# Patient Record
Sex: FEMALE | Race: WHITE
[De-identification: names, ages, dates, MRNs, and addresses within clinical notes are randomized per-mention and may not be internally consistent; named-entity substitution may affect disease eponyms.]

---

## 2019-10-01 LAB
ANION GAP SERPL CALC-SCNC: 15 MMOL/L (ref 8–16)
BASOPHILS NFR BLD AUTO: 0.3 % (ref 0–2)
BUN SERPL-MCNC: 11 MG/DL (ref 7–18)
CALCIUM SERPL-MCNC: 9.6 MG/DL (ref 8.5–10.1)
CHLORIDE SERPL-SCNC: 96 MMOL/L (ref 98–107)
CO2 SERPL-SCNC: 25.9 MMOL/L (ref 21–32)
CREAT SERPL-MCNC: 0.8 MG/DL (ref 0.6–1.3)
EOSINOPHIL NFR BLD: 1.3 % (ref 0–7)
ERYTHROCYTE [DISTWIDTH] IN BLOOD BY AUTOMATED COUNT: 16.5 % (ref 11.5–14.5)
GLUCOSE SERPL-MCNC: 271 MG/DL (ref 74–106)
HCT VFR BLD CALC: 41.5 % (ref 36–48)
HGB BLD-MCNC: 13.4 G/DL (ref 12–16)
IMM GRANULOCYTES NFR BLD: 0.3 % (ref 0–5)
LYMPHOCYTES NFR BLD AUTO: 18.3 % (ref 15–50)
MCH RBC QN AUTO: 27.9 PG (ref 26–34)
MCHC RBC AUTO-ENTMCNC: 32.3 G/DL (ref 31–37)
MCV RBC: 86.5 FL (ref 80–100)
MONOCYTES NFR BLD: 3.9 % (ref 2–11)
NEUTROPHILS NFR BLD AUTO: 75.9 % (ref 40–80)
OSMOLALITY SERPL CALC.SUM OF ELEC: 274 MOSM/KG (ref 275–300)
PLATELET # BLD: 272 10X3/UL (ref 130–400)
PMV BLD AUTO: 10.1 FL (ref 7.4–10.4)
POTASSIUM SERPL-SCNC: 3.9 MMOL/L (ref 3.5–5.1)
RBC # BLD AUTO: 4.8 10X6/UL (ref 4–5.4)
SODIUM SERPL-SCNC: 133 MMOL/L (ref 136–145)
WBC # BLD AUTO: 11.9 10X3/UL (ref 4.8–10.8)

## 2019-10-02 ENCOUNTER — HOSPITAL ENCOUNTER (INPATIENT)
Dept: HOSPITAL 84 - D.OPS | Age: 44
LOS: 2 days | Discharge: HOME | DRG: 743 | End: 2019-10-04
Attending: OBSTETRICS & GYNECOLOGY | Admitting: OBSTETRICS & GYNECOLOGY
Payer: MEDICAID

## 2019-10-02 VITALS — SYSTOLIC BLOOD PRESSURE: 95 MMHG | DIASTOLIC BLOOD PRESSURE: 63 MMHG

## 2019-10-02 VITALS — DIASTOLIC BLOOD PRESSURE: 58 MMHG | SYSTOLIC BLOOD PRESSURE: 99 MMHG

## 2019-10-02 VITALS — SYSTOLIC BLOOD PRESSURE: 103 MMHG | DIASTOLIC BLOOD PRESSURE: 64 MMHG

## 2019-10-02 VITALS
BODY MASS INDEX: 33.59 KG/M2 | BODY MASS INDEX: 33.59 KG/M2 | BODY MASS INDEX: 33.59 KG/M2 | HEIGHT: 66 IN | BODY MASS INDEX: 33.59 KG/M2 | WEIGHT: 209 LBS

## 2019-10-02 VITALS — SYSTOLIC BLOOD PRESSURE: 95 MMHG | DIASTOLIC BLOOD PRESSURE: 60 MMHG

## 2019-10-02 VITALS — DIASTOLIC BLOOD PRESSURE: 56 MMHG | SYSTOLIC BLOOD PRESSURE: 96 MMHG

## 2019-10-02 VITALS — DIASTOLIC BLOOD PRESSURE: 50 MMHG | SYSTOLIC BLOOD PRESSURE: 93 MMHG

## 2019-10-02 VITALS — SYSTOLIC BLOOD PRESSURE: 104 MMHG | DIASTOLIC BLOOD PRESSURE: 74 MMHG

## 2019-10-02 VITALS — SYSTOLIC BLOOD PRESSURE: 109 MMHG | DIASTOLIC BLOOD PRESSURE: 72 MMHG

## 2019-10-02 VITALS — DIASTOLIC BLOOD PRESSURE: 59 MMHG | SYSTOLIC BLOOD PRESSURE: 97 MMHG

## 2019-10-02 VITALS — SYSTOLIC BLOOD PRESSURE: 93 MMHG | DIASTOLIC BLOOD PRESSURE: 50 MMHG

## 2019-10-02 DIAGNOSIS — K66.0: ICD-10-CM

## 2019-10-02 DIAGNOSIS — E11.9: ICD-10-CM

## 2019-10-02 DIAGNOSIS — N80.9: Primary | ICD-10-CM

## 2019-10-02 DIAGNOSIS — I10: ICD-10-CM

## 2019-10-02 LAB — HCG UR QL: NEGATIVE

## 2019-10-02 PROCEDURE — 0UT90ZZ RESECTION OF UTERUS, OPEN APPROACH: ICD-10-PCS | Performed by: OBSTETRICS & GYNECOLOGY

## 2019-10-02 PROCEDURE — 0DNU0ZZ RELEASE OMENTUM, OPEN APPROACH: ICD-10-PCS | Performed by: OBSTETRICS & GYNECOLOGY

## 2019-10-02 PROCEDURE — 0TJB8ZZ INSPECTION OF BLADDER, VIA NATURAL OR ARTIFICIAL OPENING ENDOSCOPIC: ICD-10-PCS | Performed by: OBSTETRICS & GYNECOLOGY

## 2019-10-02 PROCEDURE — 0UB60ZZ EXCISION OF LEFT FALLOPIAN TUBE, OPEN APPROACH: ICD-10-PCS | Performed by: OBSTETRICS & GYNECOLOGY

## 2019-10-02 PROCEDURE — 0UT50ZZ RESECTION OF RIGHT FALLOPIAN TUBE, OPEN APPROACH: ICD-10-PCS | Performed by: OBSTETRICS & GYNECOLOGY

## 2019-10-02 NOTE — NUR
PT RATES PAIN AT INCISION SITE AT 8/10, SEE EMAR FOR MED ADMIN. NEW BAG OF LR
HUNG AT THIS TIME. 150CC LIGHT GREEN URINE EMPTIED FROM GALLAGHER BAG. PT
POSITIONED RIGHT SIDE. ENCOURAGED TO COUGH AND DEEP BREATH AND USE OF
INCENTIVE SPIROMETER. PT DENIES ALL OTHER NEEDS AT THIS TIME. FAMILY AT
BEDSIDE. SRUPX2, CALL LIGHT AND PHONE WITHIN REACH.

## 2019-10-02 NOTE — NUR
DR SCOTT CALLS UNIT REGARDING PT, ORDERS RECEIVED TO DUE FSBS Q1H, LOW
RESIDUAL SLIDING SCALE Q2H, CALL HER WITH ANY QUESTIONS OR CONCERNS, ORDERS
READ BACK AND VERIFIED

## 2019-10-02 NOTE — NUR
DR SCOTT CALLS UNIT, REPORT OF PT'S FSBS, VERIFIED ORDERS, DR SCOTT
REPORTS THAT SHE WILL COME SEE PT THIS AFTERNOON, CALL HER WHEN BLOOD SUGAR IS
BELOW 200, AND THAT SHE WILL PROBABLY CHANGE THE FSBS Q1H TO Q4H, AND THAT YOU
CAN CALL HER TONIGHT WITH ANY CONCERNS OR QUESTIONS

## 2019-10-02 NOTE — NUR
ROUNDING COMPLETED BY THIS RN. PT ASLEEP, EASILY AROUSED. PT STATES PAIN IS
NOW A 2/10, PAIN MEDICATION EFFECTIVE. PT DENIES ALL OTHER NEEDS AT THIS TIME.
SRUPX2, CALL LIGHT AND PHONE WITHIN REACH.

## 2019-10-02 NOTE — NUR
PT AWAKE, PT INFORMED THAT I SPOKE TO DR SCOTT, PT VERBALIZES UNDERSTANDING,
ADM INSULIN PER THIS RN AND KHADRA ROCKWELL, RN, PT REQUESTS SNACK, DENIES ANY
NAUSEA AT THIS TIME, SPOUSE AT BEDSIDE

## 2019-10-02 NOTE — NUR
RECEIVED PT VIA BED FROM RR, PT TO ROOM 1220, VS INITIATED, IV IN RIGHT WRIST
INTACT WITH NO REDNESS OR EDEMA, NS TO PUMP TO KVO AT THIS TIME, PT
CLEANED UP WITH WET WARM WASH CLOTHS, WHITE CHUX CHANGED, EMPTIED 400 MLS OF
METHYLINE BLUE URINE FROM GALLAGHER CATH, O2 AT 4L VIA NC, SCD'S CONNECTED AND
WORKING PROPERLY, PT INST ON AND DEMONSTRATED I.S. WITH GOOD EFFORT, INFORMED
PT THAT I WILL BE RIGHT BACK, PT VERBALIZES UNDERSTANDING, SPOUSE AT BEDSIDE

## 2019-10-02 NOTE — NUR
ASSESSMENT CONTINUED PER FLOW SHEET, [employer] WITH LARGE DRESSING CDI WITH
NO DRAINAGE NOTED, ICE PACK TO ABD, SMALL SPLINT PILLOW PROVIDED, PT ORIENTED
TO ROOM,  BED IN LOW POSITION, SIDE RAILS X 2, CALL LIGHT IN REACH, SPOUSE AT
BEDSIDE

## 2019-10-02 NOTE — NUR
PM ASSESSMENT COMPLETE AS CHARTED ON FLOWSHEET. PT RATES PAIN AT 5/10 TO
INCISION SITE, NEW ICE BAG PLACED AT SITE. DRESSING C/D/I. SCDS ON. NS BOLUS
COMPLETED, LR  RESTARTED. IV PATENT. STAT LOCK REPLACED ON LEFT UPPER
LEG, 120CC LIGHT GREEN URINE EMPTIED FROM BAG. THIS RN ENCOURAGED PT TO DRINK
FLUIDS TO PROMOTE URINE OUTPUT. PT STATED SHE IS USING INCENTIVE SPIROMETER
DURING WAKE HOURS. ENCOURAGED PT TO TURN, COUGH AND DEEP BREATH. FAMILY AT
BEDSIDE. PT DENIES ALL OTHER NEEDS AT THIS TIME. SRUPX2, CALL LIGHT AND PHONE
WITHIN REACH.

## 2019-10-02 NOTE — NUR
PT AROUSES TO ME IN ROOM, RATES INC PAIN 5/10, SCD'S CONTINUE ON AND WORKING
PROPERLY, DENIES NEEDS AT THIS TIME, SPOUSE AT BEDSIDE

## 2019-10-02 NOTE — NUR
NS BOLUS STARTED PER MD ORDERS, SEE EMAR, DR SCOTT OUT OF ROOM AT THIS TIME,
PT RATES INC PAIN 6/10, INFORMED PT THAT I WILL ADM PAIN MED WHEN DUE, PT
VERBALIZES UNDERSTANDING, DENIES NEEDS AT THIS TIME

## 2019-10-02 NOTE — NUR
PT RESTING WITH EYES CLOSED, AROUSES TO SOFT VERBAL STIMULATION, OBTAINED
FSBS, PT DENIES ANY NAUSEA AFTER DRINKING H20 AND CHICKEN BROTH, PT REQUESTED
AND SERVED DIET DR BEARD, SCD'S CONTINUE ON AND WORKING PROPERLY, PT DENIES
FURTHER NEEDS, SPOUSE AT BEDSIDE

## 2019-10-02 NOTE — NUR
THIS RN AND EBENEZER MCNEILL, RN ADM INSULIN PER MD ORDERS, SEE EMAR, ADM TORADOL
SIVP PER MD ORDERS, INFORMED PT THAT I WILL CALL DR SCOTT TO INFORM HER OF
BS

## 2019-10-03 VITALS — DIASTOLIC BLOOD PRESSURE: 69 MMHG | SYSTOLIC BLOOD PRESSURE: 115 MMHG

## 2019-10-03 VITALS — DIASTOLIC BLOOD PRESSURE: 69 MMHG | SYSTOLIC BLOOD PRESSURE: 111 MMHG

## 2019-10-03 VITALS — SYSTOLIC BLOOD PRESSURE: 97 MMHG | DIASTOLIC BLOOD PRESSURE: 72 MMHG

## 2019-10-03 VITALS — DIASTOLIC BLOOD PRESSURE: 62 MMHG | SYSTOLIC BLOOD PRESSURE: 94 MMHG

## 2019-10-03 VITALS — DIASTOLIC BLOOD PRESSURE: 65 MMHG | SYSTOLIC BLOOD PRESSURE: 105 MMHG

## 2019-10-03 VITALS — DIASTOLIC BLOOD PRESSURE: 70 MMHG | SYSTOLIC BLOOD PRESSURE: 104 MMHG

## 2019-10-03 LAB
ANION GAP SERPL CALC-SCNC: 13.5 MMOL/L (ref 8–16)
BASOPHILS NFR BLD AUTO: 0.3 % (ref 0–2)
BUN SERPL-MCNC: 11 MG/DL (ref 7–18)
CALCIUM SERPL-MCNC: 7 MG/DL (ref 8.5–10.1)
CHLORIDE SERPL-SCNC: 104 MMOL/L (ref 98–107)
CO2 SERPL-SCNC: 24.9 MMOL/L (ref 21–32)
CREAT SERPL-MCNC: 0.9 MG/DL (ref 0.6–1.3)
EOSINOPHIL NFR BLD: 0.7 % (ref 0–7)
ERYTHROCYTE [DISTWIDTH] IN BLOOD BY AUTOMATED COUNT: 17.1 % (ref 11.5–14.5)
GLUCOSE SERPL-MCNC: 162 MG/DL (ref 74–106)
HCT VFR BLD CALC: 33.3 % (ref 36–48)
HGB BLD-MCNC: 10.2 G/DL (ref 12–16)
IMM GRANULOCYTES NFR BLD: 0.3 % (ref 0–5)
LYMPHOCYTES NFR BLD AUTO: 14.9 % (ref 15–50)
MCH RBC QN AUTO: 27.8 PG (ref 26–34)
MCHC RBC AUTO-ENTMCNC: 30.6 G/DL (ref 31–37)
MCV RBC: 90.7 FL (ref 80–100)
MONOCYTES NFR BLD: 5.6 % (ref 2–11)
NEUTROPHILS NFR BLD AUTO: 78.2 % (ref 40–80)
OSMOLALITY SERPL CALC.SUM OF ELEC: 280 MOSM/KG (ref 275–300)
PLATELET # BLD: 205 10X3/UL (ref 130–400)
PMV BLD AUTO: 10.1 FL (ref 7.4–10.4)
POTASSIUM SERPL-SCNC: 3.4 MMOL/L (ref 3.5–5.1)
RBC # BLD AUTO: 3.67 10X6/UL (ref 4–5.4)
SODIUM SERPL-SCNC: 139 MMOL/L (ref 136–145)
WBC # BLD AUTO: 11.9 10X3/UL (ref 4.8–10.8)

## 2019-10-03 NOTE — NUR
VOIDED 100ML LIGHT GREEN URINE. COMPLAINS OF GAS PAIN. STATES SHE HAS PASSED
GAS RECTALLY A FEW TIMES TODAY

## 2019-10-03 NOTE — NUR
VOIDED 200ML LIGHT GREENISH URINE. INSTRUCTED TO CONTINUE VOIDING IN
RECEPTACLE UNTIL VOIDING AMT'S ARE INCREASED. UP AND ABOUT IN ROOM AND KHAN.
COMPLAINS OF GAS PAIN.

## 2019-10-03 NOTE — NUR
PT IN WHEELCHAIR IN HALLWAY. PT RATES PAIN 5/10 AT INCISION SITE. STATES PAIN
MED WAS EFFECTIVE. DENIES ALL OTHER NEEDS AT THIS TIME.

## 2019-10-03 NOTE — NUR
PT RATES PAIN AT 3/10. PT RESTING IN BED WITH FAMILY AT BEDSIDE. DENIES ALL
OTHER NEEDS AT THIS TIME. SRUPX2, CALL LIGHT AND PHONE WITHIN REACH.

## 2019-10-03 NOTE — NUR
REMAINS STABLE. NO SIGNS OF DISTRESS. UP AND ABOUT IN ROOM. REPORTS PASSAGE OF
GAS RECTALLY. FAMILY MEMBER AT BEDSIDE. DENIES NAUSEA. AARON REG DIET WELL.

## 2019-10-03 NOTE — NUR
PT IN BED RESTING. VSS. URINE COLLECTION EMPTIED, 200CC LIGHT GREEN. PT C/O
INCISION PAIN AT 8/10, SEE EMAR FOR PAIN MED ADMIN. FSBS 194, 2 UNITS ADMIN
PER SLIDING SCALE. ENCOURAGED PT TO CONTINUE INCENTIVE SPIROMETER DURING WAKE
HOURS, PT STATED UNDERSTANDING. CHICKEN BROTH PROVIDED AT PT REQUEST. SCDS ON.
PT DENIES ALL OTHER NEEDS. FAMILY AT BEDSIDE. SRUPX2, CALL LIGHT AND PHONE
WITHIN REACH.

## 2019-10-03 NOTE — NUR
[place] and she states that will need more specific md for
consult for internal med. report of this to dr carcamo and she states to call
[facility]. spoke with emi barrientos in [place] again and she states that would be dr conley. pt states that her family practive md is in [facility]. and does not
practice here.

## 2019-10-03 NOTE — NUR
ROUNDING COMPLETED BY THIS RN. NEW BAG OF LR HUNG AT 125MLS/HR. GALLAGHER EMPTIED
WITH 200CC LIGHT GREEN URINE. PT RESTING QUIETLY. DENIES ALL NEEDS AT THIS
TIME. FAMILY AT BEDSIDE. SRUPX2, CALL LIGHT AND PHONE WITHIN REACH.

## 2019-10-03 NOTE — NUR
CATH REMOVED NOTING 100ML IN TUBING AND BAG. IV SALINE LOCKED. ASSISTED
TO SHOWER THEN TO CHAIR TO EAT REG DIET BREAKFAST. AARON WELL.

## 2019-10-03 NOTE — NUR
notified dr conley of consult. report given -he states he will send nurse
practitioner over to see her and he will see her within hour or so.

## 2019-10-03 NOTE — NUR
ROUNDING COMPLETED BY THIS RN. PT SLEEPING, RESPIRATIONS EVEN AND UNLABORED,
NO DISTRESS NOTED. SRUPX2, CALL LIGHT AND PHONE WITHIN REACH.

## 2019-10-03 NOTE — NUR
ROUNDING COMPLETED BY THIS RN. PT SLEEPING WITH FAMILY AT BEDSIDE.
RESPIRATIONS EVEN AND UNLABORED, NO DISTRESS NOTED. SRUPX2, CALL LIGHT AND
PHONE WITHIN REACH.

## 2019-10-03 NOTE — NUR
ROUNDING COMPLETED BY THIS RN. PT REQUESTS NICOTINE PATCH. SEE EMAR FOR MED
ADMIN. PT ON COUCH WATCHING TV WITH FAMILY. RATES PAIN AT INICISION SITE 3/10.
FRESH ICE PROVIDED AT PT REQUEST. THIS NURSE ENCOURAGED USE OF INCENTIVE
SPIROMETER. (0) independent

## 2019-10-03 NOTE — NUR
ROUNDING COMPLETED BY THIS RN. [facility] SEE EMAR
FOR TORADOL ADMIN. FSBS 168, 2 UNITS INSULIN GIVEN. VSS. TEMP OF 99.2,
ENCOURAGED TO TURN, COUGH, AND DEEP BREATH AND USE OF INCENTIVE SPIROMETER
DURING WAKING HOURS. PT DENIES ALL OTHER NEEDS AT THIS TIME. SRUPX2, CALL
LIGHT AND PHONE WITHIN REACH.

## 2019-10-03 NOTE — NUR
PATIENT AWAKE AND ASKING FOR PAIN MED. O2 SAT 94-95% WITH O2 CONT AT 2L/MIN
PER NASAL CANNULA. BBS = AND CTA. BOWEL SOUNDS PRESENT. BED MOBILITY MINIMAL.
SKIN WARM DRY AND PINK. SCD BLE ON/INTERMITTENT.  GALLAGHER WITH CLEAR LIGHT
GREENISH BLUE URINE. NAVARRO. IV TO RIGHT HAND INTACT WITH SWELLING NOTED TO RIGHT
HAND AND ARM BUT WITH GOOD BLOOD RETURN. LR CONT AT 125ML/HR PER IV PUMP; NO
REDNESS OR DRNG AT SITE. ABD DSG CDI.

## 2019-10-03 NOTE — NUR
PATIENT COMPLAINS OF IV RIGHT HAND/WRIST HURTING. SALINE LOCK DISCONTINUED; NO
REDNESS OR DRNG AT SITE; MILD SWELLING TO HAND AND LOWER ARM; 1 MIN PRESSURE
HELD THEN BANDAID APPLIED.

## 2019-10-03 NOTE — NUR
ROUNDING COMPLETED BY THIS RN. PT C/O PAIN AT INICISION SITE 7/10. SEE EMAR
FOR MORPHINE ADMIN. 150CC LIGHT GREEN URINE EMPTIED FROM GALLAGHER. PT DENIES ALL
OTHER NEEDS AT THIS TIME. SRUPX2, CALL LIGHT AND PHONE WITHIN REACH.

## 2019-10-03 NOTE — NUR
SBAR HANDOFF RECEIVED FROM MARY JANE BALTAZAR RN. PATIENT REMAINS STBLE IN BED WITH NO
SIGNS OF RESP DISTRESS; 02 CONT AT 2L/M PER NASAL CANNULA. IV PATENT TO RIGHT
HAND. GALLAGHER WITH SCANT URINE IN BAG AND TUBING. ABD DSG CDI. S.O. AT BEDSIDE.

## 2019-10-03 NOTE — NUR
ROUNDING COMPLETED BY THIS RN. FSBS 156, 2 UNITS INSULIN ADMINISTERED, SEE
EMAR. VSS. PT REQUESTED CHICKEN BROTH, PROVIDED BY THIS RN. DENIES NAUSEA WITH
CLEAR LIQUID DIET. PT POSITIONED TO LEFT SIDE, WEAK COUGH, ENCOURAGED TO DEEP
BREATH AND USE INCENTIVE SPIROMETER. PT C/O INCISION PAIN. THIS RN EXPLAINED
NEXT DOSE OF PAIN MED DUE AT 2AM, PT STATED UNDERSTANDING. NEW ICE PACK
APPLIED TO INCISION AREA. FAMILY AT BEDSIDE. SRUPX2, CALL LIGHT AND PHONE
WITHIN REACH.

## 2019-10-03 NOTE — NUR
PM ASSESSMENT COMPLETE AT CHARTED ON FLOWSHEET. PT RATES PAIN AT INICISION
SITE 7/10, SEE EMAR FOR MED ADMIN. PT SITTING ON COUCH WATCHING TV WITH
FAMILY, SCDS OFF, PT STATES USING INCENTIVE SPIROMETER. FSBS 246, 2 UNITS
GIVEN PER SLIDING SCALE. INCISION COVERED WITH CANDIDA PAD, NO DRAINAGE, NO
SIGNS/SYMPTOMS OF INFECTION TO INCISION NOTED. ICE PROVIDED AT PT REQUEST.
EDUCATED PT ON SAVING URINE FOR THIS RN TO OBSERVE, PT STATES UNDERSTANDING.
SRUPX2, CALL LIGHT AND PHONE WITHIN REACH.

## 2019-10-03 NOTE — NUR
DR RANDY SCOTT AT BEDSIDE AND STATES OK TO DC O2, GIVE 1L NACL BOLUS IV THEN DC
 CATH IF UOP ADEQUATE AFTER 1  HR; TO SHOWER AND TO WALK IN HALLS. DR SCOTT REMOVED ABD DSG NOTING STAPLES INTACT WITH NO REDNESS, SWELLING, DRNG
OR FEVER NOTED.

## 2019-10-03 NOTE — NUR
DR SNOW TO BEDSIDE. UPDATED ON PATIENT CONDITION. PATIENT REMAINS STABLE WITH
NO SIGNS OF DISTRESS. SITTING UP IN BED VISITING WITH VISITORS

## 2019-10-04 VITALS — SYSTOLIC BLOOD PRESSURE: 113 MMHG | DIASTOLIC BLOOD PRESSURE: 73 MMHG

## 2019-10-04 LAB
ANION GAP SERPL CALC-SCNC: 17.2 MMOL/L (ref 8–16)
BASOPHILS NFR BLD AUTO: 0.1 % (ref 0–2)
BUN SERPL-MCNC: 11 MG/DL (ref 7–18)
CALCIUM SERPL-MCNC: 7.2 MG/DL (ref 8.5–10.1)
CHLORIDE SERPL-SCNC: 102 MMOL/L (ref 98–107)
CO2 SERPL-SCNC: 20.6 MMOL/L (ref 21–32)
CREAT SERPL-MCNC: 1.1 MG/DL (ref 0.6–1.3)
EOSINOPHIL NFR BLD: 0.7 % (ref 0–7)
ERYTHROCYTE [DISTWIDTH] IN BLOOD BY AUTOMATED COUNT: 17.2 % (ref 11.5–14.5)
GLUCOSE SERPL-MCNC: 190 MG/DL (ref 74–106)
HCT VFR BLD CALC: 37.7 % (ref 36–48)
HGB BLD-MCNC: 11.5 G/DL (ref 12–16)
IMM GRANULOCYTES NFR BLD: 0.5 % (ref 0–5)
LYMPHOCYTES NFR BLD AUTO: 10.8 % (ref 15–50)
MCH RBC QN AUTO: 27.6 PG (ref 26–34)
MCHC RBC AUTO-ENTMCNC: 30.5 G/DL (ref 31–37)
MCV RBC: 90.4 FL (ref 80–100)
MONOCYTES NFR BLD: 2.4 % (ref 2–11)
NEUTROPHILS NFR BLD AUTO: 85.5 % (ref 40–80)
OSMOLALITY SERPL CALC.SUM OF ELEC: 275 MOSM/KG (ref 275–300)
PLATELET # BLD: 216 10X3/UL (ref 130–400)
PMV BLD AUTO: 9.9 FL (ref 7.4–10.4)
POTASSIUM SERPL-SCNC: 3.8 MMOL/L (ref 3.5–5.1)
RBC # BLD AUTO: 4.17 10X6/UL (ref 4–5.4)
SODIUM SERPL-SCNC: 136 MMOL/L (ref 136–145)
WBC # BLD AUTO: 11 10X3/UL (ref 4.8–10.8)

## 2019-10-04 NOTE — NUR
PLAN OF CARE GONE OVER WITH PT, SHE IS AGREEABLE TO DISCHARGE. DENIES
QUESTIONS OR CONERNS AT THIS TIME.

## 2019-10-04 NOTE — NUR
ROUNDS MADE, PT IS AWAKE SITTING UP IN BED WHILE WATCHING TV. ABDOMEN SOFT TO
TOUCH AND PT STATES THAT SHE DOES FEEL BETTER YET RATES HER PAIN AT 7/10.
PAIN MED GIVEN PER REQUEST AS SCANNED TO EMAR. SPOUSE AT BEDSIDE, CALL LIGHT
IN REACH.

## 2019-10-04 NOTE — NUR
FSBS 196, 2 UNITS REGULAR INSULIN GIVEN AS SEEN ON EMAR.  PT COMPLAINS OF
ABDOMINAL PAIN THAT SHE STATES IS "JUST ABOVE INCISION" ENCOURAGED HER TO WALK
TO HELP PASS GAS, SHE STATES SHE HAS BEEN UP WALKING BUT NOT OUT OF HER ROOM.
RATES PAIN AT 8/10, PAIN MED GIVEN AS REQUESTED AS CHARTED ON EMAR. ASSESSMENT
COMPLETED AS CHARTED ON FLOWSHEET.  PROVIDED WITH MESH BRIEFS AND CANDIDA PADS,
DENIES ANY OTHER NEEDS AT THIS TIME. SIDE RAILS UP X 2 WITH PHONE AND CALL
LIGHT IN REACH.

## 2019-10-04 NOTE — NUR
ROUNDING COMPLETED BY THIS RN. PT RATES ABDOMINAL PAIN AT 4/10, SEE EMAR FOR
TORADOL ADMIN. 250 LIGHT GREEN URINE EMPTIED. DRESSING C/D/I. FAMILY AT
BEDSIDE. PT DENIES ALL OTHER NEEDS AT THIS TIME. SRUPX2, CALL LIGHT AND PHONE
WITHIN REACH.

## 2019-10-04 NOTE — NUR
ROUNDING COMPLETED BY THIS RN. PT C/O OF PAIN IN ABDOMEN 8/10, SEE EMAR. ICE
WATER PROVIDED AT PT REQUEST. FBSB 195, 2 UNIT GIVEN PER SLIDING SCALE, SEE
EMAR. FAMILY AT BEDSIDE. PT DENIES ALL OTHER NEEDS AT THIS TIME. SRUPX2, CALL
LIGHT AND PHONE WITHIN REACH.

## 2019-10-04 NOTE — NUR
ROUNDING COMPLETED BY THIS RN. PT SLEEPING. RESPIRATIONS EVEN AND UNLABORED.
FAMILY AT BEDSIDE. SRUPX2, CALL LIGHT AND PHONE WITHIN REACH.

## 2019-10-04 NOTE — NUR
PAIN MED GIVEN PER EMAR. DR SCOTT ROUNDS AND TALKS WITH PT ABOUT DISCHARGE
AND FOLLOW WITH PRIMARY MD AS SOON AS POSSIBLE TO CONTROL BLOOD SUGAR.

## 2019-10-04 NOTE — NUR
DR SCOTT IN TO SEE PT FOR AM ROUNDS.  PER MD PT UNDERSTANDS SHE IS TO WALK
IN HALLS, POSSIBLE DISCHARGE LATER TODAY.

## 2019-10-04 NOTE — NUR
ROUNDING COMPLETED BY THIS RN. PT SLEEPING. RESPIRATIONS EVEN AND UNLABORED,
NO DISTRESS NOTED. FAMILY AT BEDSIDE. SRUPX2, CALL LIGHT AND PHONE WITHIN
REACH.

## 2019-10-04 NOTE — NUR
FSBS 178,  PT UNDERSTANDS SHE WILL RECEIVED INSULIN.  C/O ABD PAIN/CRAMPING
THAT SHE RATES AT 9/10. ABDOMINAL BINDER PLACED ON PER PT AGREEMENT SHE STATES
IMMEDIATE RELIEF OF PRESSURE ON INCISION.  ENCOURAGED TO WALK IN HALLS IF SHE
FEELS BETTER. FAMILY AT BEDSIDE.

## 2019-10-04 NOTE — NUR
DISCHARGE INSTRUCTIONS REVIEWED WITH PATIENT AT THIS TIME. OPPORTUNITY FOR
QUESTIONS GIVEN. DISCHARGE INSTRUCTIONS SIGNED AND COPIES GIVEN TO PATIEN.
PRESCRIPTIONS FOR PERCOCET, GLUCOMETER, LANCETS, AND STRIPS ALSO GIVEN TO
PATIENT. PT INFORMED OF FOLLOW UP APPOINTMEMTS WITH DR SCOTT FOR STAPLE
REMOVAL AND POST OP VIST AND UNDERSTANDING VERBALIZED. PT INSTRUCTED TO CALL
WHEN SHE WAS DRESSED FOR DISCHARGE. JAREN CALDWELL RN

## 2019-10-04 NOTE — NUR
PAIN REASSESSMENT, PT TURNED TO LEFT SIDE WITH EYES CLOSED AND RESP EVEN, NO
SIGNS OF DISTRESS NOTED. SIDE RAILS UP X 2 WITH CALL LIGHT IN REACH. SPOUSE AT
BEDSIDE.

## 2019-10-04 NOTE — NUR
PT CALLS OUT ON CALL LIGHT WITH REQUEST FOR TOWELS AND BED LINENS. THIS RN TO
ROOM. PT STATES SHE COUGHED AND VOIDED ON BED, IS GETTING UP TO SHOWER. PT TO
SHOWER WITH ASSIST FROM SIG OTHER. PT PROVIDED WITH TOWELS AND CLEAN GOWN,
STATES SHE HAS OWN SOAPS. BED LINENS CHANGED. PT OUT OF SHOWER AND ASSISTED TO
DRESS BY SIG OTHER. PT AMBULATING IN ROOM, DENIES FURTHER NEEDS. WILL CONT TO
MONITOR.

## 2019-10-04 NOTE — NUR
ROUNDING COMPLETED BY THIS RN. PT SLEEPING. RESPIRATIONS EVEN AND UNLABORED.
SRUPX2, CALL LIGHT AND PHONE WITHIN REACH.

## 2019-10-07 NOTE — OP
PATIENT NAME:  TAVO GARCIA                         MEDICAL RECORD: [id number]
:[birth year]                                             LOCATION:[id number]
                                                         ADMISSION DATE:10/02/19
SURGEON:  TONI SCOTT DO            
 
 
DATE OF OPERATION:  10/02/2019
 
PREOPERATIVE DIAGNOSES:  Endometriosis and chronic pelvic pain.
 
POSTOPERATIVE DIAGNOSES:  Endometriosis, chronic pelvic pain, extensive
intra-abdominal adhesions.
 
PRIMARY SURGEON:  Brayan Rojas MD
 
ASSISTANT SURGEON:  Toni Scott DO
 
ANESTHESIOLOGIST:  Michoacano Snyder CRNA.
 
PROCEDURE:  Diagnostic laparoscopy converted to open total abdominal
hysterectomy, right salpingectomy, left partial salpingectomy and cystoscopy.
 
FINDINGS:  External intra-abdominal adhesions including adhesions of omentum to
anterior abdominal wall and fibrotic adhesions of bladder to anterior uterus due
to blood-tinged urine and cystoscopy was performed.  The dome of the bladder had
mild bruising, no lacerations.  Normal ureteral jets noted bilaterally.
 
SPECIMENS:  Uterus, right fallopian tube, partial left fallopian tube and
cervix.
 
ESTIMATED BLOOD LOSS:  200 cc.
 
IV FLUIDS:  1700 cc.
 
URINE OUTPUT:  100 cc blood-tinged urine.
 
COMPLICATIONS:  None.
 
CONDITION:  Stable.
 
PROCEDURE:  The risks, benefits, alternatives and indications of the procedure
were discussed with the patient.  She voiced understanding of the procedure and
signed the consent.
 
DESCRIPTION OF PROCEDURE:  She was taken to the OR where general anesthesia was
administered and found to be adequate.  She was placed in the dorsal lithotomy
position.  She was prepped and draped in the normal sterile fashion.  A speculum
was placed in the posterior aspect of the vagina and a single tooth tenaculum
was used to grasp the anterior lip of the cervix.  The uterus was sounded to 10
cm.  The cervix was further dilated to accommodate the VCare uterine
manipulator.  The uterine manipulator was placed and the tenaculum was removed
from the vagina.  The speculum was removed from the vagina.  Gloves were changed
and attention was turned to the abdomen.  The abdomen was elevated and a 5-mm
incision was created in the umbilicus after Marcaine placement.  A 5-mm port was
placed with direct laparoscopic guidance.  The pneumoperitoneum was achieved to
15 mmHg.  Extensive intra-abdominal adhesions were noted, especially adhesions
of the omentum to the anterior abdominal wall; however, right lower quadrant
port was able to be placed under direct laparoscopic visualization.  At that
 
 
 
OPERATIVE REPORT                               [id number]    QUICK,TAVO ROSSANA       
 
 
time, due to extensive intra-abdominal adhesions, decision was made to perform
an open total hysterectomy.  The pneumoperitoneum was released from the abdomen.
 The VCare manipulator was removed from the cervix and the uterus, and the ports
were removed from the abdomen.  A Pfannenstiel skin incision was made with the
scalpel and carried down to the underlying layer of the fascia with the Bovie. 
The fascia was incised in the midline and extended laterally.  The inferior
aspect of the fascial incision was grasped with Kocher clamps and the rectus
muscle was dissected off sharply.  Attention was then turned to the superior
aspect of the fascial incision.  The rectus muscle was dissected off in a
similar fashion.  The rectus muscle was  in the midline down to the
level of the peritoneum.  The peritoneum was grasped with 2 Allises noted to be
free of adherent bowel and entered with Metzenbaum scissors.  The peritoneum was
further  with a combination of gentle traction and blunt and sharp
dissection.  The patient was placed in Trendelenburg position and the bowel was
packed away using moist laparotomy sponges.  The uterus was grasped along the
cornu of the uterus.  Upward traction was applied to facilitate exposure and
dissection.  The round ligament on the left side was identified, grasped with a
Dagmar suture, ligated and cut with the Bovie.  The anterior leaf of the broad
ligament was dissected to the midline and the vesicouterine peritoneum.  The
bladder was dissected off the lower uterine segment and cervix with Metzenbaum
scissor.  A window was created in the avascular area of the posterior leaf.  The
ovarian ligament and partial fallopian tube was clamped, cut and doubly ligated,
unable to perform complete salpingectomy due to adhesive disease.  The uterine
vessels were skeletonized, doubly clamped and cut.  The pedicles were suture
ligated and good hemostasis was noted.  The entire procedure was repeated on the
right side.  At that time, the cardinal ligament and uterosacral ligaments were
sequentially clamped, cut and suture ligated.  Curved Z clamps were placed at
the angles of the vagina.  The cervix was removed from the vaginal cuff with
scissors.  The vaginal angle was closed with figure-of-eight 0 Vicryl sutures
and tied to the uterosacral ligament to provide support to the vaginal cuff. 
The vaginal cuff was then closed with figure-of-eight 0 Vicryl sutures and good
hemostasis was noted.  The pelvis was irrigated with warm sterile saline.  Due
to extensive intra-abdominal adhesions and adhesions of the bladder to the
anterior part of the uterus, decision was made for cystoscopy and methylene blue
was given.  The patient was taken out of Trendelenburg position.  All the
instruments were removed from the abdomen after hemostasis was noted to be
adequate.  Lorin was placed at the vaginal cuff.  The rectus muscle was closed
with 2-0 chromic suture.  The fascial incision was closed with 2-0 PDS looped
suture with good hemostasis.  The subcutaneous fat was closed with 2-0 plain
suture with good hemostasis.  The skin was closed with staples.  The umbilical
and right lower quadrant port sites were closed with 3-0 Monocryl with good
hemostasis and Dermabond covering.  At that time, attention was then turned to
the cystoscopy.  The Vigil was removed and the cystoscope was inserted into the
urethra and bladder.  Good ureteral jets were noted bilaterally.  Small amount
of bruising noted at the dome of the fundus; however, no lacerations were noted.
 The cystoscope was removed and the Vigil was replaced.  The patient tolerated
the procedure well.  She was awakened and taken to recovery room in stable
condition.
 
TRANSINT:[id number] Voice Confirmation ID: [id number] DOCUMENT ID: [id number]
 
 
 
OPERATIVE REPORT                               [id number]    TAVO GARCIA REBECCA DO            
 
 
 
Electronically Signed by TONI VIEYRA on 10/07/19 at 1313
 
 
 
 
 
 
 
 
 
 
 
 
 
 
 
 
 
 
 
 
 
 
 
 
 
 
 
 
 
 
 
 
 
 
 
 
 
 
 
 
 
CC:                                                             [id number]
DICTATION DATE: 10/04/19 1200     :     10/04/19 1319      DIS IN  
                                                                      10/04/19
[id number] [place] [address]

## 2019-10-16 ENCOUNTER — HOSPITAL ENCOUNTER (INPATIENT)
Dept: HOSPITAL 84 - D.ER | Age: 44
LOS: 2 days | Discharge: ANOTHER HOSPITAL | DRG: 853 | End: 2019-10-18
Attending: OBSTETRICS & GYNECOLOGY | Admitting: OBSTETRICS & GYNECOLOGY
Payer: MEDICAID

## 2019-10-16 VITALS — DIASTOLIC BLOOD PRESSURE: 53 MMHG | SYSTOLIC BLOOD PRESSURE: 77 MMHG

## 2019-10-16 VITALS — SYSTOLIC BLOOD PRESSURE: 150 MMHG | DIASTOLIC BLOOD PRESSURE: 55 MMHG

## 2019-10-16 VITALS — DIASTOLIC BLOOD PRESSURE: 72 MMHG | SYSTOLIC BLOOD PRESSURE: 89 MMHG

## 2019-10-16 VITALS — SYSTOLIC BLOOD PRESSURE: 101 MMHG | DIASTOLIC BLOOD PRESSURE: 60 MMHG

## 2019-10-16 VITALS — SYSTOLIC BLOOD PRESSURE: 97 MMHG | DIASTOLIC BLOOD PRESSURE: 68 MMHG

## 2019-10-16 VITALS — SYSTOLIC BLOOD PRESSURE: 102 MMHG | DIASTOLIC BLOOD PRESSURE: 65 MMHG

## 2019-10-16 VITALS — SYSTOLIC BLOOD PRESSURE: 137 MMHG | DIASTOLIC BLOOD PRESSURE: 53 MMHG

## 2019-10-16 VITALS — DIASTOLIC BLOOD PRESSURE: 61 MMHG | SYSTOLIC BLOOD PRESSURE: 92 MMHG

## 2019-10-16 VITALS — DIASTOLIC BLOOD PRESSURE: 65 MMHG | SYSTOLIC BLOOD PRESSURE: 86 MMHG

## 2019-10-16 VITALS — DIASTOLIC BLOOD PRESSURE: 50 MMHG | SYSTOLIC BLOOD PRESSURE: 129 MMHG

## 2019-10-16 VITALS — DIASTOLIC BLOOD PRESSURE: 65 MMHG | SYSTOLIC BLOOD PRESSURE: 98 MMHG

## 2019-10-16 VITALS — SYSTOLIC BLOOD PRESSURE: 97 MMHG | DIASTOLIC BLOOD PRESSURE: 60 MMHG

## 2019-10-16 VITALS — DIASTOLIC BLOOD PRESSURE: 60 MMHG | SYSTOLIC BLOOD PRESSURE: 91 MMHG

## 2019-10-16 VITALS — DIASTOLIC BLOOD PRESSURE: 60 MMHG | SYSTOLIC BLOOD PRESSURE: 118 MMHG

## 2019-10-16 VITALS — DIASTOLIC BLOOD PRESSURE: 62 MMHG | SYSTOLIC BLOOD PRESSURE: 94 MMHG

## 2019-10-16 VITALS
BODY MASS INDEX: 36.56 KG/M2 | HEIGHT: 66 IN | BODY MASS INDEX: 36.56 KG/M2 | BODY MASS INDEX: 36.56 KG/M2 | WEIGHT: 227.48 LBS | WEIGHT: 227.48 LBS | BODY MASS INDEX: 36.56 KG/M2 | HEIGHT: 66 IN

## 2019-10-16 VITALS — DIASTOLIC BLOOD PRESSURE: 67 MMHG | SYSTOLIC BLOOD PRESSURE: 90 MMHG

## 2019-10-16 VITALS — SYSTOLIC BLOOD PRESSURE: 97 MMHG | DIASTOLIC BLOOD PRESSURE: 55 MMHG

## 2019-10-16 VITALS — SYSTOLIC BLOOD PRESSURE: 86 MMHG | DIASTOLIC BLOOD PRESSURE: 58 MMHG

## 2019-10-16 VITALS — SYSTOLIC BLOOD PRESSURE: 165 MMHG | DIASTOLIC BLOOD PRESSURE: 107 MMHG

## 2019-10-16 VITALS — SYSTOLIC BLOOD PRESSURE: 91 MMHG | DIASTOLIC BLOOD PRESSURE: 61 MMHG

## 2019-10-16 VITALS — DIASTOLIC BLOOD PRESSURE: 48 MMHG | SYSTOLIC BLOOD PRESSURE: 74 MMHG

## 2019-10-16 VITALS — DIASTOLIC BLOOD PRESSURE: 72 MMHG | SYSTOLIC BLOOD PRESSURE: 95 MMHG

## 2019-10-16 VITALS — DIASTOLIC BLOOD PRESSURE: 62 MMHG | SYSTOLIC BLOOD PRESSURE: 101 MMHG

## 2019-10-16 VITALS — SYSTOLIC BLOOD PRESSURE: 96 MMHG | DIASTOLIC BLOOD PRESSURE: 66 MMHG

## 2019-10-16 VITALS — DIASTOLIC BLOOD PRESSURE: 53 MMHG | SYSTOLIC BLOOD PRESSURE: 84 MMHG

## 2019-10-16 VITALS — DIASTOLIC BLOOD PRESSURE: 49 MMHG | SYSTOLIC BLOOD PRESSURE: 81 MMHG

## 2019-10-16 VITALS — DIASTOLIC BLOOD PRESSURE: 60 MMHG | SYSTOLIC BLOOD PRESSURE: 88 MMHG

## 2019-10-16 VITALS — DIASTOLIC BLOOD PRESSURE: 47 MMHG | SYSTOLIC BLOOD PRESSURE: 80 MMHG

## 2019-10-16 VITALS — DIASTOLIC BLOOD PRESSURE: 64 MMHG | SYSTOLIC BLOOD PRESSURE: 106 MMHG

## 2019-10-16 VITALS — DIASTOLIC BLOOD PRESSURE: 54 MMHG | SYSTOLIC BLOOD PRESSURE: 80 MMHG

## 2019-10-16 VITALS — DIASTOLIC BLOOD PRESSURE: 59 MMHG | SYSTOLIC BLOOD PRESSURE: 94 MMHG

## 2019-10-16 VITALS — DIASTOLIC BLOOD PRESSURE: 77 MMHG | SYSTOLIC BLOOD PRESSURE: 112 MMHG

## 2019-10-16 VITALS — DIASTOLIC BLOOD PRESSURE: 69 MMHG | SYSTOLIC BLOOD PRESSURE: 107 MMHG

## 2019-10-16 VITALS — SYSTOLIC BLOOD PRESSURE: 68 MMHG | DIASTOLIC BLOOD PRESSURE: 50 MMHG

## 2019-10-16 VITALS — DIASTOLIC BLOOD PRESSURE: 51 MMHG | SYSTOLIC BLOOD PRESSURE: 96 MMHG

## 2019-10-16 VITALS — SYSTOLIC BLOOD PRESSURE: 101 MMHG | DIASTOLIC BLOOD PRESSURE: 61 MMHG

## 2019-10-16 VITALS — SYSTOLIC BLOOD PRESSURE: 86 MMHG | DIASTOLIC BLOOD PRESSURE: 54 MMHG

## 2019-10-16 VITALS — DIASTOLIC BLOOD PRESSURE: 45 MMHG | SYSTOLIC BLOOD PRESSURE: 69 MMHG

## 2019-10-16 VITALS — SYSTOLIC BLOOD PRESSURE: 83 MMHG | DIASTOLIC BLOOD PRESSURE: 50 MMHG

## 2019-10-16 VITALS — SYSTOLIC BLOOD PRESSURE: 116 MMHG | DIASTOLIC BLOOD PRESSURE: 84 MMHG

## 2019-10-16 VITALS — DIASTOLIC BLOOD PRESSURE: 49 MMHG | SYSTOLIC BLOOD PRESSURE: 108 MMHG

## 2019-10-16 VITALS — SYSTOLIC BLOOD PRESSURE: 88 MMHG | DIASTOLIC BLOOD PRESSURE: 63 MMHG

## 2019-10-16 VITALS — SYSTOLIC BLOOD PRESSURE: 75 MMHG | DIASTOLIC BLOOD PRESSURE: 48 MMHG

## 2019-10-16 VITALS — DIASTOLIC BLOOD PRESSURE: 69 MMHG | SYSTOLIC BLOOD PRESSURE: 90 MMHG

## 2019-10-16 DIAGNOSIS — F17.200: ICD-10-CM

## 2019-10-16 DIAGNOSIS — E87.6: ICD-10-CM

## 2019-10-16 DIAGNOSIS — E11.21: ICD-10-CM

## 2019-10-16 DIAGNOSIS — N39.0: ICD-10-CM

## 2019-10-16 DIAGNOSIS — E66.9: ICD-10-CM

## 2019-10-16 DIAGNOSIS — A41.50: Primary | ICD-10-CM

## 2019-10-16 DIAGNOSIS — I10: ICD-10-CM

## 2019-10-16 DIAGNOSIS — N17.0: ICD-10-CM

## 2019-10-16 DIAGNOSIS — M72.6: ICD-10-CM

## 2019-10-16 DIAGNOSIS — R65.21: ICD-10-CM

## 2019-10-16 DIAGNOSIS — G89.29: ICD-10-CM

## 2019-10-16 DIAGNOSIS — L03.311: ICD-10-CM

## 2019-10-16 LAB
ALBUMIN SERPL-MCNC: 1.7 G/DL (ref 3.4–5)
ALP SERPL-CCNC: 168 U/L (ref 46–116)
ALT SERPL-CCNC: 21 U/L (ref 10–68)
ANION GAP SERPL CALC-SCNC: 13.7 MMOL/L (ref 8–16)
ANION GAP SERPL CALC-SCNC: 16.5 MMOL/L (ref 8–16)
APPEARANCE UR: (no result)
APTT BLD: 27.6 SECONDS (ref 22.8–39.4)
BACTERIA #/AREA URNS HPF: (no result) /HPF
BASOPHILS NFR BLD AUTO: 0.1 % (ref 0–2)
BILIRUB SERPL-MCNC: 0.39 MG/DL (ref 0.2–1.3)
BILIRUB SERPL-MCNC: NEGATIVE MG/DL
BUN SERPL-MCNC: 12 MG/DL (ref 7–18)
BUN SERPL-MCNC: 12 MG/DL (ref 7–18)
CALCIUM SERPL-MCNC: 6.9 MG/DL (ref 8.5–10.1)
CALCIUM SERPL-MCNC: 7.9 MG/DL (ref 8.5–10.1)
CHLORIDE SERPL-SCNC: 103 MMOL/L (ref 98–107)
CHLORIDE SERPL-SCNC: 95 MMOL/L (ref 98–107)
CK MB SERPL-MCNC: 0.3 U/L (ref 0–3.6)
CK SERPL-CCNC: 19 UL (ref 21–215)
CO2 SERPL-SCNC: 22.6 MMOL/L (ref 21–32)
CO2 SERPL-SCNC: 27.6 MMOL/L (ref 21–32)
COLOR UR: YELLOW
CREAT SERPL-MCNC: 1.1 MG/DL (ref 0.6–1.3)
CREAT SERPL-MCNC: 1.4 MG/DL (ref 0.6–1.3)
EOSINOPHIL NFR BLD: 0.5 % (ref 0–7)
ERYTHROCYTE [DISTWIDTH] IN BLOOD BY AUTOMATED COUNT: 17.1 % (ref 11.5–14.5)
GLOBULIN SER-MCNC: 5.1 G/L
GLUCOSE SERPL-MCNC: 1000 MG/DL
GLUCOSE SERPL-MCNC: 298 MG/DL (ref 74–106)
GLUCOSE SERPL-MCNC: 381 MG/DL (ref 74–106)
HCT VFR BLD CALC: 30.7 % (ref 36–48)
HGB BLD-MCNC: 9.8 G/DL (ref 12–16)
IMM GRANULOCYTES NFR BLD: 0.4 % (ref 0–5)
INR PPP: 1.09 (ref 0.85–1.17)
KETONES UR STRIP-MCNC: NEGATIVE MG/DL
LIPASE SERPL-CCNC: 675 U/L (ref 73–393)
LYMPHOCYTES NFR BLD AUTO: 5.9 % (ref 15–50)
MCH RBC QN AUTO: 27.1 PG (ref 26–34)
MCHC RBC AUTO-ENTMCNC: 31.9 G/DL (ref 31–37)
MCV RBC: 84.8 FL (ref 80–100)
MONOCYTES NFR BLD: 5 % (ref 2–11)
NEUTROPHILS NFR BLD AUTO: 88.1 % (ref 40–80)
NITRITE UR-MCNC: POSITIVE MG/ML
OSMOLALITY SERPL CALC.SUM OF ELEC: 283 MOSM/KG (ref 275–300)
OSMOLALITY SERPL CALC.SUM OF ELEC: 288 MOSM/KG (ref 275–300)
PH UR STRIP: 5 [PH] (ref 5–6)
PLATELET # BLD: 536 10X3/UL (ref 130–400)
PMV BLD AUTO: 9.7 FL (ref 7.4–10.4)
POTASSIUM SERPL-SCNC: 2.3 MMOL/L (ref 3.5–5.1)
POTASSIUM SERPL-SCNC: 3.1 MMOL/L (ref 3.5–5.1)
PROT SERPL-MCNC: 6.8 G/DL (ref 6.4–8.2)
PROT UR-MCNC: (no result) MG/DL
PROTHROMBIN TIME: 13.6 SECONDS (ref 11.6–15)
RBC # BLD AUTO: 3.62 10X6/UL (ref 4–5.4)
SODIUM SERPL-SCNC: 134 MMOL/L (ref 136–145)
SODIUM SERPL-SCNC: 139 MMOL/L (ref 136–145)
SP GR UR STRIP: 1.02 (ref 1–1.02)
SQUAMOUS #/AREA URNS HPF: (no result) /HPF (ref 0–5)
TROPONIN I SERPL-MCNC: < 0.017 NG/ML (ref 0–0.06)
UROBILINOGEN UR-MCNC: NORMAL MG/DL
WBC # BLD AUTO: 16.4 10X3/UL (ref 4.8–10.8)
WBC #/AREA URNS HPF: >50 /HPF

## 2019-10-16 PROCEDURE — 0JBC0ZZ EXCISION OF PELVIC REGION SUBCUTANEOUS TISSUE AND FASCIA, OPEN APPROACH: ICD-10-PCS | Performed by: OBSTETRICS & GYNECOLOGY

## 2019-10-16 PROCEDURE — 0TQB8ZZ REPAIR BLADDER, VIA NATURAL OR ARTIFICIAL OPENING ENDOSCOPIC: ICD-10-PCS | Performed by: OBSTETRICS & GYNECOLOGY

## 2019-10-16 NOTE — MORECARE
CASE MANAGEMENT DISCHARGE SUMMARY
 
 
PATIENT: TAVO GARCIA                   UNIT: [id number]
ACCOUNT#: [id number]                       ADM DATE: 10/16/19
AGE: 44     : [birth year]  SEX: F            ROOM/BED: D.2305    
AUTHOR: VY GARCIA                             PHYSICIAN:                               
 
REFERRING PHYSICIAN: ROYCE ROMERO MD        
DATE OF SERVICE: 10/16/19
Discharge Plan
 
 
Patient Name: TAVO GARCIA
[facility]
Encounter #: [id number]
Medical Record #: [id number]
: 1975
Planned Disposition: 
Anticipated Discharge Date: 
 
Discharge Date: 
Expected LOS: 
Initial Reviewer: [email or web address]
Initial Review Date: 10/16/2019
Generated: 10/16/19   7:51 pm 
Comments
 
DCP- Discharge Planning
 
Updated by [id number]: Nataliya Kingsley on 10/16/19   5:50 pm CT
CM attempted to see patient for d/c planning assessment.  Patient is post-op 
currently on vent and no family available at this time. CM will continue to 
follow and assist as needed with discharge planning / needs
  
 
 
 
 
 
 
 
Patient Name: TAVO GARCIA
 
Encounter #: [id number]
Page [phone]
 
 
 
 
 
Electronically Signed by VY GARCIA on 10/16/19 at 1851
 
 
 
 
 
 
**All edits/amendments must be made on the electronic document**
 
DICTATION DATE: 10/16/19 1851     : JOLIE  10/16/19 1851     
RPT#: [id number]                                DC DATE:        
                                               STATUS: ADM IN  
[facility]
[address] [place] [address]
***END OF REPORT***

## 2019-10-16 NOTE — OP
PATIENT NAME:  TAVO GARCIA                         MEDICAL RECORD: [id number]
:[birth year]                                             LOCATION:.[facility]    D.2305
                                                         ADMISSION DATE:10/16/19
SURGEON:  ANTONIO MONAE MD              
 
 
DATE OF OPERATION:  10/16/2019
 
SURGEON:  Antonio Monae MD
 
ASSISTANT:  April Ferrell DO and Brayan Rojas MD
 
ANESTHESIA:  General anesthesia by Jennifer Jolley CRNA.
 
DIAGNOSES:  Stellate bladder laceration on the dome of the bladder, necrotizing
fasciitis of the rectus fascia, uncontrolled diabetes mellitus type 2.
 
PROCEDURES:  Cystoscopy, repair of bladder laceration.
 
FINDINGS:  The bladder is widely adherent to the undersurface of the rectus
fascia as well as the pelvic side wall.  A stellate laceration of the bladder
through the dome of the bladder was noted.  On cystoscopy, there were single
ureteral orifices bilaterally.  No bladder tumors were seen.
 
BLOOD LOSS:  Minimal.
 
CLINICAL HISTORY:  This is a 44-year-old female who had a MELISSA-BSO 2 weeks ago. 
On her preoperative testing, she was found to have uncontrolled diabetes
mellitus type 2.  She was given instructions and diabetes control, but
apparently the patient is completely noncompliant.  She presented with drainage
from her incision in the abdomen.  There were also signs of necrosis in the
abdominal incision.  She was brought on an emergent basis to the OR for
treatment of necrotizing fascitis.  She is already asleep under general
anesthesia.  Her abdomen has been reopened through the Pfannenstiel incision
that she had.  The areas of necrotic rectus fascia have been excised.  When they
attempted to get into the pelvis as the CT scan was suggestive of involvement of
the pelvis, the bladder was entered into by a dissecting finger.  At this point,
the gynecologist asked for urology consultation.  The patient is in stirrups and
she has been prepped vaginally in order to allow for cystoscopy also.
 
DESCRIPTION OF PROCEDURE:  The patient was already in the operating table with
the abdomen open.  I used sharp and blunt dissection to completely free the
bladder wall from surrounding adhesions to the posterior rectus sheath, free the
space of Retzius, free both lateral surfaces of the bladder, and on the dome to
free the bladder dome from adhesions to bowel.  There is a stellate laceration
in the form of a letter Y.  The 2 arms of the letter Y extend to the right of
the patient and the vertical bar of the letter Y extends to the left lateral
portion of the bladder.  Stay sutures were placed using 2-0 nylon at full
thickness depth at the extent of the laceration.  Two layered closure was
performed.  The first layer used 2-0 Vicryl in a full-thickness running closure.
 The second layer was of a seromuscular imbrication suture using 2-0 Vicryl.  At
this point, the Vigil catheter was removed.  A 17-Yoruba cystoscope was
introduced into the bladder.  Looking into the bladder, I could see no bladder
lesions per se.  There were areas of bladder injury from the laceration at the
dome of the bladder.  As I filled the bladder up with 500 mL of saline, areas of
leakage from our repair could be seen.  There were 2 such areas.  These were
closed using figure-of-eight sutures of 2-0 Vicryl.  Finally, I refilled the
bladder again and this time, the bladder was completely watertight.  The
 
 
 
OPERATIVE REPORT                               [id number]    RADHA,TAVO TRACY       
 
 
cystoscope was then removed.  A 16-Yoruba Vigil catheter was then inserted into
the bladder and put to bag drainage.  The Vigil balloon has 10 cc of sterile
water in it.  The plan is to leave the Vigil catheter in for 2 weeks and then
perform a cystogram to check on bladder healing and whether there is any leakage
left.  She will also require a Hung-Somers drain to prevent accumulation of
any urine into the pelvis.  Looking in the pelvis, I do not see any signs of
necrosis here.  It seems very clean.  At this point, Dr. Rojas and Dr. Ferrell will continue on with their surgery.
 
TRANSINT:[id number] Voice Confirmation ID: [id number] DOCUMENT ID: [id number]
                                           
                                           ANTONIO MONAE MD              
 
 
 
Electronically Signed by ANTONIO MONAE on 10/16/19 at 1350
 
 
 
 
 
 
 
 
 
 
 
 
 
 
 
 
 
 
 
 
 
 
 
 
 
 
 
 
 
 
 
CC:                                                             [id number]
DICTATION DATE: 10/16/19 1244     :     10/16/19 1301      ADM IN  
                                                                              
[facility]                                          
[address] [place] [address]

## 2019-10-17 VITALS — DIASTOLIC BLOOD PRESSURE: 61 MMHG | SYSTOLIC BLOOD PRESSURE: 83 MMHG

## 2019-10-17 VITALS — SYSTOLIC BLOOD PRESSURE: 99 MMHG | DIASTOLIC BLOOD PRESSURE: 47 MMHG

## 2019-10-17 VITALS — DIASTOLIC BLOOD PRESSURE: 61 MMHG | SYSTOLIC BLOOD PRESSURE: 92 MMHG

## 2019-10-17 VITALS — DIASTOLIC BLOOD PRESSURE: 57 MMHG | SYSTOLIC BLOOD PRESSURE: 94 MMHG

## 2019-10-17 VITALS — SYSTOLIC BLOOD PRESSURE: 80 MMHG | DIASTOLIC BLOOD PRESSURE: 57 MMHG

## 2019-10-17 VITALS — DIASTOLIC BLOOD PRESSURE: 64 MMHG | SYSTOLIC BLOOD PRESSURE: 94 MMHG

## 2019-10-17 VITALS — DIASTOLIC BLOOD PRESSURE: 59 MMHG | SYSTOLIC BLOOD PRESSURE: 99 MMHG

## 2019-10-17 VITALS — SYSTOLIC BLOOD PRESSURE: 101 MMHG | DIASTOLIC BLOOD PRESSURE: 67 MMHG

## 2019-10-17 VITALS — DIASTOLIC BLOOD PRESSURE: 52 MMHG | SYSTOLIC BLOOD PRESSURE: 101 MMHG

## 2019-10-17 VITALS — DIASTOLIC BLOOD PRESSURE: 61 MMHG | SYSTOLIC BLOOD PRESSURE: 94 MMHG

## 2019-10-17 VITALS — DIASTOLIC BLOOD PRESSURE: 58 MMHG | SYSTOLIC BLOOD PRESSURE: 80 MMHG

## 2019-10-17 VITALS — SYSTOLIC BLOOD PRESSURE: 98 MMHG | DIASTOLIC BLOOD PRESSURE: 51 MMHG

## 2019-10-17 VITALS — SYSTOLIC BLOOD PRESSURE: 97 MMHG | DIASTOLIC BLOOD PRESSURE: 83 MMHG

## 2019-10-17 VITALS — SYSTOLIC BLOOD PRESSURE: 88 MMHG | DIASTOLIC BLOOD PRESSURE: 60 MMHG

## 2019-10-17 VITALS — SYSTOLIC BLOOD PRESSURE: 99 MMHG | DIASTOLIC BLOOD PRESSURE: 50 MMHG

## 2019-10-17 VITALS — SYSTOLIC BLOOD PRESSURE: 88 MMHG | DIASTOLIC BLOOD PRESSURE: 57 MMHG

## 2019-10-17 VITALS — SYSTOLIC BLOOD PRESSURE: 92 MMHG | DIASTOLIC BLOOD PRESSURE: 59 MMHG

## 2019-10-17 VITALS — SYSTOLIC BLOOD PRESSURE: 84 MMHG | DIASTOLIC BLOOD PRESSURE: 51 MMHG

## 2019-10-17 VITALS — SYSTOLIC BLOOD PRESSURE: 97 MMHG | DIASTOLIC BLOOD PRESSURE: 57 MMHG

## 2019-10-17 VITALS — DIASTOLIC BLOOD PRESSURE: 48 MMHG | SYSTOLIC BLOOD PRESSURE: 88 MMHG

## 2019-10-17 VITALS — DIASTOLIC BLOOD PRESSURE: 55 MMHG | SYSTOLIC BLOOD PRESSURE: 95 MMHG

## 2019-10-17 VITALS — SYSTOLIC BLOOD PRESSURE: 87 MMHG | DIASTOLIC BLOOD PRESSURE: 62 MMHG

## 2019-10-17 VITALS — SYSTOLIC BLOOD PRESSURE: 106 MMHG | DIASTOLIC BLOOD PRESSURE: 54 MMHG

## 2019-10-17 VITALS — DIASTOLIC BLOOD PRESSURE: 57 MMHG | SYSTOLIC BLOOD PRESSURE: 98 MMHG

## 2019-10-17 VITALS — SYSTOLIC BLOOD PRESSURE: 97 MMHG | DIASTOLIC BLOOD PRESSURE: 60 MMHG

## 2019-10-17 VITALS — SYSTOLIC BLOOD PRESSURE: 81 MMHG | DIASTOLIC BLOOD PRESSURE: 59 MMHG

## 2019-10-17 VITALS — SYSTOLIC BLOOD PRESSURE: 102 MMHG | DIASTOLIC BLOOD PRESSURE: 53 MMHG

## 2019-10-17 VITALS — DIASTOLIC BLOOD PRESSURE: 60 MMHG | SYSTOLIC BLOOD PRESSURE: 82 MMHG

## 2019-10-17 VITALS — DIASTOLIC BLOOD PRESSURE: 62 MMHG | SYSTOLIC BLOOD PRESSURE: 102 MMHG

## 2019-10-17 VITALS — DIASTOLIC BLOOD PRESSURE: 63 MMHG | SYSTOLIC BLOOD PRESSURE: 96 MMHG

## 2019-10-17 VITALS — DIASTOLIC BLOOD PRESSURE: 66 MMHG | SYSTOLIC BLOOD PRESSURE: 102 MMHG

## 2019-10-17 VITALS — DIASTOLIC BLOOD PRESSURE: 54 MMHG | SYSTOLIC BLOOD PRESSURE: 100 MMHG

## 2019-10-17 VITALS — SYSTOLIC BLOOD PRESSURE: 103 MMHG | DIASTOLIC BLOOD PRESSURE: 59 MMHG

## 2019-10-17 VITALS — SYSTOLIC BLOOD PRESSURE: 103 MMHG | DIASTOLIC BLOOD PRESSURE: 61 MMHG

## 2019-10-17 VITALS — SYSTOLIC BLOOD PRESSURE: 94 MMHG | DIASTOLIC BLOOD PRESSURE: 60 MMHG

## 2019-10-17 VITALS — DIASTOLIC BLOOD PRESSURE: 60 MMHG | SYSTOLIC BLOOD PRESSURE: 103 MMHG

## 2019-10-17 VITALS — DIASTOLIC BLOOD PRESSURE: 59 MMHG | SYSTOLIC BLOOD PRESSURE: 81 MMHG

## 2019-10-17 VITALS — SYSTOLIC BLOOD PRESSURE: 81 MMHG | DIASTOLIC BLOOD PRESSURE: 56 MMHG

## 2019-10-17 VITALS — SYSTOLIC BLOOD PRESSURE: 93 MMHG | DIASTOLIC BLOOD PRESSURE: 60 MMHG

## 2019-10-17 VITALS — SYSTOLIC BLOOD PRESSURE: 85 MMHG | DIASTOLIC BLOOD PRESSURE: 61 MMHG

## 2019-10-17 VITALS — DIASTOLIC BLOOD PRESSURE: 51 MMHG | SYSTOLIC BLOOD PRESSURE: 106 MMHG

## 2019-10-17 VITALS — DIASTOLIC BLOOD PRESSURE: 54 MMHG | SYSTOLIC BLOOD PRESSURE: 99 MMHG

## 2019-10-17 VITALS — DIASTOLIC BLOOD PRESSURE: 54 MMHG | SYSTOLIC BLOOD PRESSURE: 107 MMHG

## 2019-10-17 VITALS — DIASTOLIC BLOOD PRESSURE: 59 MMHG | SYSTOLIC BLOOD PRESSURE: 100 MMHG

## 2019-10-17 VITALS — DIASTOLIC BLOOD PRESSURE: 56 MMHG | SYSTOLIC BLOOD PRESSURE: 91 MMHG

## 2019-10-17 VITALS — SYSTOLIC BLOOD PRESSURE: 89 MMHG | DIASTOLIC BLOOD PRESSURE: 61 MMHG

## 2019-10-17 VITALS — DIASTOLIC BLOOD PRESSURE: 58 MMHG | SYSTOLIC BLOOD PRESSURE: 90 MMHG

## 2019-10-17 VITALS — DIASTOLIC BLOOD PRESSURE: 60 MMHG | SYSTOLIC BLOOD PRESSURE: 88 MMHG

## 2019-10-17 VITALS — DIASTOLIC BLOOD PRESSURE: 51 MMHG | SYSTOLIC BLOOD PRESSURE: 105 MMHG

## 2019-10-17 VITALS — SYSTOLIC BLOOD PRESSURE: 99 MMHG | DIASTOLIC BLOOD PRESSURE: 62 MMHG

## 2019-10-17 VITALS — DIASTOLIC BLOOD PRESSURE: 58 MMHG | SYSTOLIC BLOOD PRESSURE: 95 MMHG

## 2019-10-17 VITALS — DIASTOLIC BLOOD PRESSURE: 66 MMHG | SYSTOLIC BLOOD PRESSURE: 97 MMHG

## 2019-10-17 VITALS — SYSTOLIC BLOOD PRESSURE: 103 MMHG | DIASTOLIC BLOOD PRESSURE: 58 MMHG

## 2019-10-17 VITALS — SYSTOLIC BLOOD PRESSURE: 98 MMHG | DIASTOLIC BLOOD PRESSURE: 61 MMHG

## 2019-10-17 VITALS — SYSTOLIC BLOOD PRESSURE: 85 MMHG | DIASTOLIC BLOOD PRESSURE: 44 MMHG

## 2019-10-17 VITALS — DIASTOLIC BLOOD PRESSURE: 72 MMHG | SYSTOLIC BLOOD PRESSURE: 105 MMHG

## 2019-10-17 VITALS — SYSTOLIC BLOOD PRESSURE: 98 MMHG | DIASTOLIC BLOOD PRESSURE: 62 MMHG

## 2019-10-17 VITALS — DIASTOLIC BLOOD PRESSURE: 62 MMHG | SYSTOLIC BLOOD PRESSURE: 88 MMHG

## 2019-10-17 VITALS — SYSTOLIC BLOOD PRESSURE: 90 MMHG | DIASTOLIC BLOOD PRESSURE: 58 MMHG

## 2019-10-17 VITALS — SYSTOLIC BLOOD PRESSURE: 101 MMHG | DIASTOLIC BLOOD PRESSURE: 58 MMHG

## 2019-10-17 VITALS — DIASTOLIC BLOOD PRESSURE: 61 MMHG | SYSTOLIC BLOOD PRESSURE: 89 MMHG

## 2019-10-17 VITALS — SYSTOLIC BLOOD PRESSURE: 89 MMHG | DIASTOLIC BLOOD PRESSURE: 57 MMHG

## 2019-10-17 VITALS — DIASTOLIC BLOOD PRESSURE: 45 MMHG | SYSTOLIC BLOOD PRESSURE: 97 MMHG

## 2019-10-17 VITALS — SYSTOLIC BLOOD PRESSURE: 89 MMHG | DIASTOLIC BLOOD PRESSURE: 60 MMHG

## 2019-10-17 VITALS — SYSTOLIC BLOOD PRESSURE: 91 MMHG | DIASTOLIC BLOOD PRESSURE: 63 MMHG

## 2019-10-17 VITALS — DIASTOLIC BLOOD PRESSURE: 57 MMHG | SYSTOLIC BLOOD PRESSURE: 91 MMHG

## 2019-10-17 VITALS — SYSTOLIC BLOOD PRESSURE: 97 MMHG | DIASTOLIC BLOOD PRESSURE: 53 MMHG

## 2019-10-17 VITALS — DIASTOLIC BLOOD PRESSURE: 61 MMHG | SYSTOLIC BLOOD PRESSURE: 102 MMHG

## 2019-10-17 VITALS — SYSTOLIC BLOOD PRESSURE: 83 MMHG | DIASTOLIC BLOOD PRESSURE: 56 MMHG

## 2019-10-17 VITALS — DIASTOLIC BLOOD PRESSURE: 62 MMHG | SYSTOLIC BLOOD PRESSURE: 105 MMHG

## 2019-10-17 VITALS — DIASTOLIC BLOOD PRESSURE: 48 MMHG | SYSTOLIC BLOOD PRESSURE: 100 MMHG

## 2019-10-17 VITALS — SYSTOLIC BLOOD PRESSURE: 103 MMHG | DIASTOLIC BLOOD PRESSURE: 49 MMHG

## 2019-10-17 VITALS — SYSTOLIC BLOOD PRESSURE: 91 MMHG | DIASTOLIC BLOOD PRESSURE: 69 MMHG

## 2019-10-17 VITALS — DIASTOLIC BLOOD PRESSURE: 68 MMHG | SYSTOLIC BLOOD PRESSURE: 106 MMHG

## 2019-10-17 VITALS — SYSTOLIC BLOOD PRESSURE: 106 MMHG | DIASTOLIC BLOOD PRESSURE: 57 MMHG

## 2019-10-17 VITALS — DIASTOLIC BLOOD PRESSURE: 55 MMHG | SYSTOLIC BLOOD PRESSURE: 82 MMHG

## 2019-10-17 VITALS — SYSTOLIC BLOOD PRESSURE: 102 MMHG | DIASTOLIC BLOOD PRESSURE: 67 MMHG

## 2019-10-17 VITALS — DIASTOLIC BLOOD PRESSURE: 58 MMHG | SYSTOLIC BLOOD PRESSURE: 99 MMHG

## 2019-10-17 VITALS — DIASTOLIC BLOOD PRESSURE: 60 MMHG | SYSTOLIC BLOOD PRESSURE: 90 MMHG

## 2019-10-17 VITALS — SYSTOLIC BLOOD PRESSURE: 90 MMHG | DIASTOLIC BLOOD PRESSURE: 55 MMHG

## 2019-10-17 VITALS — DIASTOLIC BLOOD PRESSURE: 63 MMHG | SYSTOLIC BLOOD PRESSURE: 102 MMHG

## 2019-10-17 VITALS — SYSTOLIC BLOOD PRESSURE: 107 MMHG | DIASTOLIC BLOOD PRESSURE: 43 MMHG

## 2019-10-17 VITALS — DIASTOLIC BLOOD PRESSURE: 59 MMHG | SYSTOLIC BLOOD PRESSURE: 102 MMHG

## 2019-10-17 VITALS — DIASTOLIC BLOOD PRESSURE: 64 MMHG | SYSTOLIC BLOOD PRESSURE: 101 MMHG

## 2019-10-17 VITALS — SYSTOLIC BLOOD PRESSURE: 100 MMHG | DIASTOLIC BLOOD PRESSURE: 55 MMHG

## 2019-10-17 VITALS — DIASTOLIC BLOOD PRESSURE: 52 MMHG | SYSTOLIC BLOOD PRESSURE: 73 MMHG

## 2019-10-17 VITALS — DIASTOLIC BLOOD PRESSURE: 62 MMHG | SYSTOLIC BLOOD PRESSURE: 82 MMHG

## 2019-10-17 VITALS — DIASTOLIC BLOOD PRESSURE: 57 MMHG | SYSTOLIC BLOOD PRESSURE: 85 MMHG

## 2019-10-17 VITALS — SYSTOLIC BLOOD PRESSURE: 100 MMHG | DIASTOLIC BLOOD PRESSURE: 45 MMHG

## 2019-10-17 VITALS — DIASTOLIC BLOOD PRESSURE: 56 MMHG | SYSTOLIC BLOOD PRESSURE: 95 MMHG

## 2019-10-17 VITALS — DIASTOLIC BLOOD PRESSURE: 62 MMHG | SYSTOLIC BLOOD PRESSURE: 97 MMHG

## 2019-10-17 VITALS — DIASTOLIC BLOOD PRESSURE: 55 MMHG | SYSTOLIC BLOOD PRESSURE: 84 MMHG

## 2019-10-17 LAB
ANION GAP SERPL CALC-SCNC: 16.7 MMOL/L (ref 8–16)
BUN SERPL-MCNC: 18 MG/DL (ref 7–18)
CALCIUM SERPL-MCNC: 6.4 MG/DL (ref 8.5–10.1)
CHLORIDE SERPL-SCNC: 108 MMOL/L (ref 98–107)
CO2 SERPL-SCNC: 21.8 MMOL/L (ref 21–32)
CREAT SERPL-MCNC: 2.2 MG/DL (ref 0.6–1.3)
ERYTHROCYTE [DISTWIDTH] IN BLOOD BY AUTOMATED COUNT: 19.1 % (ref 11.5–14.5)
GLUCOSE SERPL-MCNC: 185 MG/DL (ref 74–106)
HCT VFR BLD CALC: 30.2 % (ref 36–48)
HGB BLD-MCNC: 8.7 G/DL (ref 12–16)
LYMPHOCYTES NFR BLD AUTO: 8 % (ref 15–50)
MAGNESIUM SERPL-MCNC: 2.2 MG/DL (ref 1.8–2.4)
MCH RBC QN AUTO: 26.8 PG (ref 26–34)
MCHC RBC AUTO-ENTMCNC: 28.8 G/DL (ref 31–37)
MCV RBC: 92.9 FL (ref 80–100)
MONOCYTES NFR BLD: 4 % (ref 2–11)
NEUTROPHILS NFR BLD AUTO: 75 % (ref 40–80)
OSMOLALITY SERPL CALC.SUM OF ELEC: 289 MOSM/KG (ref 275–300)
PHOSPHATE SERPL-MCNC: 6.3 MG/DL (ref 2.5–4.9)
PLATELET # BLD EST: (no result) 10*3/UL
PLATELET # BLD: 736 10X3/UL (ref 130–400)
PMV BLD AUTO: 9.7 FL (ref 7.4–10.4)
POTASSIUM SERPL-SCNC: 4.5 MMOL/L (ref 3.5–5.1)
RBC # BLD AUTO: 3.25 10X6/UL (ref 4–5.4)
SODIUM SERPL-SCNC: 142 MMOL/L (ref 136–145)
WBC # BLD AUTO: 27.2 10X3/UL (ref 4.8–10.8)

## 2019-10-17 NOTE — MORECARE
CASE MANAGEMENT DISCHARGE SUMMARY
 
 
PATIENT: TAVO GARCIA                   UNIT: [id number]
ACCOUNT#: [id number]                       ADM DATE: 10/16/19
AGE: 44     : [birth year]  SEX: F            ROOM/BED: D.2305    
AUTHOR: RADHA,DOC                             PHYSICIAN:                               
 
REFERRING PHYSICIAN: ROYCE ROMERO MD        
DATE OF SERVICE: 10/17/19
Discharge Plan
 
 
Patient Name: TAVO GARCIA
Facility: [facility]:[place]
Encounter #: [id number]
Medical Record #: [id number]
: 1975
Planned Disposition: 
Anticipated Discharge Date: 
 
Discharge Date: 
Expected LOS: 
Initial Reviewer: [email or web address]
Initial Review Date: 10/17/2019
Generated: 10/17/19   7:07 pm 
Comments
 
DCP- Discharge Planning
 
Updated by [id number]: Nataliya Kingsley on 10/17/19   5:04 pm CT
Patient Name: TAVO GARCIA                                     
Admission Status: ER   
Accout number: [id number]                              
Admission Date: 10-   
: 1975                                                        
Admission Diagnosis:   
Attending: ROYCE ROMERO                                                
Current LOS:  1   
  
Anticipated DC Date:    
Planned Disposition:    
[facility]   
  
  
Discharge Planning Comments: CM met with patient's  to complete 
initial dc planning assessment.  CM educated  (Sri)  on the CM role 
and verbal consent given by patient to complete assessment.   Patient lives 
at  home with her  where she is independent with her care.  At 
discharge patient plans to return home and feels this is a safe discharge. 
 
Sri states they live in a [address] [address]  CM 
discussed availability of home health, rehab services, and medical equipment. 
Sri is uncertain of what her discharge needs may be since patient has 
surgery scheduled tomorrow and Monday.  Patient will most likely need HH and 
potentially rehab. Patient is still on vent sedated at this time. CM will 
continue to follow and will assist as needed with dc plans/needs.    
  
  
  
  
  
  
: Nataliya Kingsley
DCP- Discharge Planning
 
Updated by [email or web address]: Nataliya Kingsley on 10/16/19   5:50 pm CT
CM attempted to see patient for d/c planning assessment.  Patient is post-op 
currently on vent and no family available at this time. CM will continue to 
follow and assist as needed with discharge planning / needs
 DCPIA - Discharge Planning Initial Assessment
 
Updated by [email or web address]: Naatliya Kingsley on 10/17/19   5:46 pm
*  Is the patient Alert and Oriented?
Yes
*  How many steps to enter\exit or inside your home?
 
*  PCP
Deejay Romeo MD - [place]
*  Pharmacy
[facility] [place]
*  Preadmission Environment
Home with Family
*  ADLs
Independent
*  Equipment
None
*  List name and contact numbers for known caregivers / representatives who 
currently or will assist patient after discharge:
SRI GARCIA - [facility] - [phone]
*  Verbal permission to speak to the caregivers and representatives has been 
obtained from the patient.
N/A
*  Community resources currently utilized
None
*  Additional services required to return to the preadmission environment?
No
*  Can the patient safely return to the preadmission environment?
Yes
*  Has this patient been hospitalized within the prior 30 days at any 
hospital?
Yes
 
 
 
 
 
 
 
 
Last DP export: 10/17/19   4:51 
Patient Name: TAVO GARCIA
Encounter #: [id number]
Page [id number]
 
 
 
 
 
Electronically Signed by VY GARCIA on 10/17/19 at 1808
 
 
 
 
 
 
**All edits/amendments must be made on the electronic document**
 
DICTATION DATE: 10/17/19 1807     : JOLIE  10/17/19 1807     
RPT#: [id number]                                DC DATE:        
                                               STATUS: ADM IN  
[facility]
 [place] [address]
***END OF REPORT***

## 2019-10-17 NOTE — MORECARE
CASE MANAGEMENT DISCHARGE SUMMARY
 
 
PATIENT: TAVO GRACIA                   UNIT: [id number]
ACCOUNT#: [id number]                       ADM DATE: 10/16/19
AGE: 44     : [birth year]  SEX: F            ROOM/BED: D.2305    
AUTHOR: VY GARCIA                             PHYSICIAN:                               
 
REFERRING PHYSICIAN: ROYCE ROMERO MD        
DATE OF SERVICE: 10/17/19
Discharge Plan
 
 
Patient Name: TAVO GARCIA
[facility]
Encounter #: [id number]
Medical Record #: [id number]
: 1975
Planned Disposition: 
Anticipated Discharge Date: 
 
Discharge Date: 
Expected LOS: 
Initial Reviewer: [email or web address]
Initial Review Date: 10/17/2019
Generated: 10/17/19   6:42 pm 
Comments
 
DCP- Discharge Planning
 
Updated by [id number]: Nataliya Kingsley on 10/16/19   5:50 pm CT
CM attempted to see patient for d/c planning assessment.  Patient is post-op 
currently on vent and no family available at this time. CM will continue to 
follow and assist as needed with discharge planning / needs
  
 
 
 
 
 
 
 
 
Last DP export: 10/16/19   5:51 
Patient Name: TAVO GARCIA
 
Encounter #: [id number]
Page [id number]
 
 
 
 
 
Electronically Signed by VY GARCIA on 10/17/19 at 1742
 
 
 
 
 
 
**All edits/amendments must be made on the electronic document**
 
DICTATION DATE: 10/17/19 1742     : JOLIE  10/17/19 1742     
RPT#: [id number]                                DC DATE:        
                                               STATUS: ADM IN  
[facility]
[address] [place] [address]
***END OF REPORT***

## 2019-10-17 NOTE — MORECARE
CASE MANAGEMENT DISCHARGE SUMMARY
 
 
PATIENT: TAVO GARCIA                   UNIT: [id number]
ACCOUNT#: [id number]                       ADM DATE: 10/16/19
AGE: 44     : [birth year]  SEX: F            ROOM/BED: D.2305    
AUTHOR: VY GARCIA                             PHYSICIAN:                               
 
REFERRING PHYSICIAN: ROYCE ROMERO MD        
DATE OF SERVICE: 10/17/19
Discharge Plan
 
 
Patient Name: TAVO GARCIA
Facility: [facility]:[place]
Encounter #: [id number]
Medical Record #: [id number]
: 1975
Planned Disposition: 
Anticipated Discharge Date: 
 
Discharge Date: 
Expected LOS: 
Initial Reviewer: [email or web address]
Initial Review Date: 10/17/2019
Generated: 10/17/19   6:50 pm 
Comments
 
DCP- Discharge Planning
 
Updated by [email or web address]: Nataliya Kingsley on 10/16/19   5:50 pm CT
CM attempted to see patient for d/c planning assessment.  Patient is post-op 
currently on vent and no family available at this time. CM will continue to 
follow and assist as needed with discharge planning / needs
 DCPIA - Discharge Planning Initial Assessment
 
Updated by [id number]: Nataliya Kingsley on 10/17/19   5:46 pm
*  Is the patient Alert and Oriented?
Yes
*  How many steps to enter\exit or inside your home?
 
*  PCP
Deejay Romeo MD - [place]
*  Pharmacy
[facility] IN [place]
*  Preadmission Environment
Home with Family
*  ADLs
Independent
*  Equipment
 
None
*  List name and contact numbers for known caregivers / representatives who 
currently or will assist patient after discharge:
SRI GARCIA - [facility] - [phone]
*  Verbal permission to speak to the caregivers and representatives has been 
obtained from the patient.
N/A
*  Community resources currently utilized
None
*  Additional services required to return to the preadmission environment?
No
*  Can the patient safely return to the preadmission environment?
Yes
*  Has this patient been hospitalized within the prior 30 days at any 
hospital?
Yes
 
 
 
 
 
 
 
Last DP export: 10/17/19   4:42 
Patient Name: TAVO GARCIA
Encounter #: [id number]
Page [id number]
 
 
 
 
 
Electronically Signed by VY GARCIA on 10/17/19 at 1751
 
 
 
 
 
 
**All edits/amendments must be made on the electronic document**
 
DICTATION DATE: 10/17/19 1750     : JOLIE  10/17/19 1750     
RPT#: [id number]                                DC DATE:        
                                               STATUS: ADM IN  
[facility]
 [place] [address]
***END OF REPORT***

## 2019-10-18 VITALS — DIASTOLIC BLOOD PRESSURE: 62 MMHG | SYSTOLIC BLOOD PRESSURE: 96 MMHG

## 2019-10-18 VITALS — SYSTOLIC BLOOD PRESSURE: 95 MMHG | DIASTOLIC BLOOD PRESSURE: 62 MMHG

## 2019-10-18 VITALS — SYSTOLIC BLOOD PRESSURE: 107 MMHG | DIASTOLIC BLOOD PRESSURE: 63 MMHG

## 2019-10-18 VITALS — DIASTOLIC BLOOD PRESSURE: 59 MMHG | SYSTOLIC BLOOD PRESSURE: 98 MMHG

## 2019-10-18 VITALS — DIASTOLIC BLOOD PRESSURE: 60 MMHG | SYSTOLIC BLOOD PRESSURE: 99 MMHG

## 2019-10-18 VITALS — SYSTOLIC BLOOD PRESSURE: 106 MMHG | DIASTOLIC BLOOD PRESSURE: 72 MMHG

## 2019-10-18 VITALS — SYSTOLIC BLOOD PRESSURE: 105 MMHG | DIASTOLIC BLOOD PRESSURE: 64 MMHG

## 2019-10-18 VITALS — DIASTOLIC BLOOD PRESSURE: 66 MMHG | SYSTOLIC BLOOD PRESSURE: 111 MMHG

## 2019-10-18 VITALS — DIASTOLIC BLOOD PRESSURE: 73 MMHG | SYSTOLIC BLOOD PRESSURE: 112 MMHG

## 2019-10-18 VITALS — SYSTOLIC BLOOD PRESSURE: 95 MMHG | DIASTOLIC BLOOD PRESSURE: 59 MMHG

## 2019-10-18 VITALS — DIASTOLIC BLOOD PRESSURE: 56 MMHG | SYSTOLIC BLOOD PRESSURE: 96 MMHG

## 2019-10-18 VITALS — DIASTOLIC BLOOD PRESSURE: 58 MMHG | SYSTOLIC BLOOD PRESSURE: 105 MMHG

## 2019-10-18 VITALS — DIASTOLIC BLOOD PRESSURE: 58 MMHG | SYSTOLIC BLOOD PRESSURE: 96 MMHG

## 2019-10-18 VITALS — DIASTOLIC BLOOD PRESSURE: 29 MMHG | SYSTOLIC BLOOD PRESSURE: 94 MMHG

## 2019-10-18 VITALS — DIASTOLIC BLOOD PRESSURE: 52 MMHG | SYSTOLIC BLOOD PRESSURE: 89 MMHG

## 2019-10-18 VITALS — SYSTOLIC BLOOD PRESSURE: 112 MMHG | DIASTOLIC BLOOD PRESSURE: 73 MMHG

## 2019-10-18 VITALS — DIASTOLIC BLOOD PRESSURE: 54 MMHG | SYSTOLIC BLOOD PRESSURE: 103 MMHG

## 2019-10-18 VITALS — SYSTOLIC BLOOD PRESSURE: 94 MMHG | DIASTOLIC BLOOD PRESSURE: 61 MMHG

## 2019-10-18 VITALS — DIASTOLIC BLOOD PRESSURE: 59 MMHG | SYSTOLIC BLOOD PRESSURE: 102 MMHG

## 2019-10-18 VITALS — SYSTOLIC BLOOD PRESSURE: 114 MMHG | DIASTOLIC BLOOD PRESSURE: 65 MMHG

## 2019-10-18 VITALS — SYSTOLIC BLOOD PRESSURE: 98 MMHG | DIASTOLIC BLOOD PRESSURE: 51 MMHG

## 2019-10-18 VITALS — DIASTOLIC BLOOD PRESSURE: 70 MMHG | SYSTOLIC BLOOD PRESSURE: 98 MMHG

## 2019-10-18 VITALS — DIASTOLIC BLOOD PRESSURE: 64 MMHG | SYSTOLIC BLOOD PRESSURE: 99 MMHG

## 2019-10-18 VITALS — SYSTOLIC BLOOD PRESSURE: 93 MMHG | DIASTOLIC BLOOD PRESSURE: 58 MMHG

## 2019-10-18 VITALS — SYSTOLIC BLOOD PRESSURE: 99 MMHG | DIASTOLIC BLOOD PRESSURE: 53 MMHG

## 2019-10-18 VITALS — SYSTOLIC BLOOD PRESSURE: 82 MMHG | DIASTOLIC BLOOD PRESSURE: 59 MMHG

## 2019-10-18 VITALS — DIASTOLIC BLOOD PRESSURE: 72 MMHG | SYSTOLIC BLOOD PRESSURE: 106 MMHG

## 2019-10-18 VITALS — DIASTOLIC BLOOD PRESSURE: 60 MMHG | SYSTOLIC BLOOD PRESSURE: 103 MMHG

## 2019-10-18 VITALS — DIASTOLIC BLOOD PRESSURE: 65 MMHG | SYSTOLIC BLOOD PRESSURE: 105 MMHG

## 2019-10-18 VITALS — DIASTOLIC BLOOD PRESSURE: 64 MMHG | SYSTOLIC BLOOD PRESSURE: 103 MMHG

## 2019-10-18 VITALS — DIASTOLIC BLOOD PRESSURE: 71 MMHG | SYSTOLIC BLOOD PRESSURE: 101 MMHG

## 2019-10-18 VITALS — DIASTOLIC BLOOD PRESSURE: 63 MMHG | SYSTOLIC BLOOD PRESSURE: 101 MMHG

## 2019-10-18 VITALS — SYSTOLIC BLOOD PRESSURE: 89 MMHG | DIASTOLIC BLOOD PRESSURE: 52 MMHG

## 2019-10-18 VITALS — SYSTOLIC BLOOD PRESSURE: 99 MMHG | DIASTOLIC BLOOD PRESSURE: 59 MMHG

## 2019-10-18 VITALS — DIASTOLIC BLOOD PRESSURE: 63 MMHG | SYSTOLIC BLOOD PRESSURE: 107 MMHG

## 2019-10-18 VITALS — DIASTOLIC BLOOD PRESSURE: 56 MMHG | SYSTOLIC BLOOD PRESSURE: 99 MMHG

## 2019-10-18 VITALS — DIASTOLIC BLOOD PRESSURE: 57 MMHG | SYSTOLIC BLOOD PRESSURE: 103 MMHG

## 2019-10-18 VITALS — SYSTOLIC BLOOD PRESSURE: 106 MMHG | DIASTOLIC BLOOD PRESSURE: 57 MMHG

## 2019-10-18 VITALS — SYSTOLIC BLOOD PRESSURE: 101 MMHG | DIASTOLIC BLOOD PRESSURE: 63 MMHG

## 2019-10-18 VITALS — SYSTOLIC BLOOD PRESSURE: 100 MMHG | DIASTOLIC BLOOD PRESSURE: 60 MMHG

## 2019-10-18 VITALS — SYSTOLIC BLOOD PRESSURE: 105 MMHG | DIASTOLIC BLOOD PRESSURE: 65 MMHG

## 2019-10-18 VITALS — SYSTOLIC BLOOD PRESSURE: 136 MMHG | DIASTOLIC BLOOD PRESSURE: 82 MMHG

## 2019-10-18 VITALS — SYSTOLIC BLOOD PRESSURE: 102 MMHG | DIASTOLIC BLOOD PRESSURE: 60 MMHG

## 2019-10-18 VITALS — SYSTOLIC BLOOD PRESSURE: 99 MMHG | DIASTOLIC BLOOD PRESSURE: 62 MMHG

## 2019-10-18 VITALS — SYSTOLIC BLOOD PRESSURE: 99 MMHG | DIASTOLIC BLOOD PRESSURE: 57 MMHG

## 2019-10-18 VITALS — SYSTOLIC BLOOD PRESSURE: 103 MMHG | DIASTOLIC BLOOD PRESSURE: 59 MMHG

## 2019-10-18 VITALS — DIASTOLIC BLOOD PRESSURE: 59 MMHG | SYSTOLIC BLOOD PRESSURE: 96 MMHG

## 2019-10-18 VITALS — SYSTOLIC BLOOD PRESSURE: 89 MMHG | DIASTOLIC BLOOD PRESSURE: 67 MMHG

## 2019-10-18 VITALS — DIASTOLIC BLOOD PRESSURE: 64 MMHG | SYSTOLIC BLOOD PRESSURE: 106 MMHG

## 2019-10-18 VITALS — SYSTOLIC BLOOD PRESSURE: 103 MMHG | DIASTOLIC BLOOD PRESSURE: 63 MMHG

## 2019-10-18 VITALS — DIASTOLIC BLOOD PRESSURE: 61 MMHG | SYSTOLIC BLOOD PRESSURE: 99 MMHG

## 2019-10-18 VITALS — SYSTOLIC BLOOD PRESSURE: 111 MMHG | DIASTOLIC BLOOD PRESSURE: 61 MMHG

## 2019-10-18 VITALS — SYSTOLIC BLOOD PRESSURE: 105 MMHG | DIASTOLIC BLOOD PRESSURE: 60 MMHG

## 2019-10-18 VITALS — SYSTOLIC BLOOD PRESSURE: 104 MMHG | DIASTOLIC BLOOD PRESSURE: 65 MMHG

## 2019-10-18 VITALS — DIASTOLIC BLOOD PRESSURE: 56 MMHG | SYSTOLIC BLOOD PRESSURE: 101 MMHG

## 2019-10-18 VITALS — SYSTOLIC BLOOD PRESSURE: 100 MMHG | DIASTOLIC BLOOD PRESSURE: 57 MMHG

## 2019-10-18 VITALS — DIASTOLIC BLOOD PRESSURE: 61 MMHG | SYSTOLIC BLOOD PRESSURE: 97 MMHG

## 2019-10-18 VITALS — DIASTOLIC BLOOD PRESSURE: 50 MMHG | SYSTOLIC BLOOD PRESSURE: 102 MMHG

## 2019-10-18 VITALS — DIASTOLIC BLOOD PRESSURE: 62 MMHG | SYSTOLIC BLOOD PRESSURE: 103 MMHG

## 2019-10-18 VITALS — DIASTOLIC BLOOD PRESSURE: 68 MMHG | SYSTOLIC BLOOD PRESSURE: 99 MMHG

## 2019-10-18 VITALS — SYSTOLIC BLOOD PRESSURE: 97 MMHG | DIASTOLIC BLOOD PRESSURE: 76 MMHG

## 2019-10-18 VITALS — SYSTOLIC BLOOD PRESSURE: 102 MMHG | DIASTOLIC BLOOD PRESSURE: 57 MMHG

## 2019-10-18 VITALS — DIASTOLIC BLOOD PRESSURE: 61 MMHG | SYSTOLIC BLOOD PRESSURE: 101 MMHG

## 2019-10-18 VITALS — DIASTOLIC BLOOD PRESSURE: 56 MMHG | SYSTOLIC BLOOD PRESSURE: 104 MMHG

## 2019-10-18 VITALS — SYSTOLIC BLOOD PRESSURE: 108 MMHG | DIASTOLIC BLOOD PRESSURE: 50 MMHG

## 2019-10-18 VITALS — DIASTOLIC BLOOD PRESSURE: 58 MMHG | SYSTOLIC BLOOD PRESSURE: 99 MMHG

## 2019-10-18 VITALS — SYSTOLIC BLOOD PRESSURE: 106 MMHG | DIASTOLIC BLOOD PRESSURE: 63 MMHG

## 2019-10-18 VITALS — SYSTOLIC BLOOD PRESSURE: 102 MMHG | DIASTOLIC BLOOD PRESSURE: 63 MMHG

## 2019-10-18 VITALS — SYSTOLIC BLOOD PRESSURE: 106 MMHG | DIASTOLIC BLOOD PRESSURE: 66 MMHG

## 2019-10-18 VITALS — DIASTOLIC BLOOD PRESSURE: 66 MMHG | SYSTOLIC BLOOD PRESSURE: 99 MMHG

## 2019-10-18 LAB
ALBUMIN SERPL-MCNC: 1.5 G/DL (ref 3.4–5)
ALP SERPL-CCNC: 153 U/L (ref 46–116)
ALT SERPL-CCNC: 15 U/L (ref 10–68)
ANION GAP SERPL CALC-SCNC: 21 MMOL/L (ref 8–16)
APPEARANCE UR: (no result)
BACTERIA #/AREA URNS HPF: (no result) /HPF
BASOPHILS NFR BLD AUTO: 0.2 % (ref 0–2)
BILIRUB SERPL-MCNC: 0.28 MG/DL (ref 0.2–1.3)
BILIRUB SERPL-MCNC: NEGATIVE MG/DL
BUN SERPL-MCNC: 27 MG/DL (ref 7–18)
CALCIUM SERPL-MCNC: 5.8 MG/DL (ref 8.5–10.1)
CHLORIDE SERPL-SCNC: 105 MMOL/L (ref 98–107)
CO2 SERPL-SCNC: 18.9 MMOL/L (ref 21–32)
COLOR UR: (no result)
CREAT SERPL-MCNC: 4.1 MG/DL (ref 0.6–1.3)
CREATININE - URINE: 183.6 MG/DL (ref 30–125)
EOSINOPHIL NFR BLD: 0.1 % (ref 0–7)
ERYTHROCYTE [DISTWIDTH] IN BLOOD BY AUTOMATED COUNT: 20.1 % (ref 11.5–14.5)
GLOBULIN SER-MCNC: 4.5 G/L
GLUCOSE SERPL-MCNC: 179 MG/DL (ref 74–106)
GLUCOSE SERPL-MCNC: NEGATIVE MG/DL
HCT VFR BLD CALC: 29.3 % (ref 36–48)
HGB BLD-MCNC: 8.3 G/DL (ref 12–16)
IMM GRANULOCYTES NFR BLD: 0.9 % (ref 0–5)
KETONES UR STRIP-MCNC: NEGATIVE MG/DL
LYMPHOCYTES NFR BLD AUTO: 5.9 % (ref 15–50)
MCH RBC QN AUTO: 26.7 PG (ref 26–34)
MCHC RBC AUTO-ENTMCNC: 28.3 G/DL (ref 31–37)
MCV RBC: 94.2 FL (ref 80–100)
MONOCYTES NFR BLD: 5.8 % (ref 2–11)
NEUTROPHILS NFR BLD AUTO: 87.1 % (ref 40–80)
NITRITE UR-MCNC: NEGATIVE MG/ML
OSMOLALITY SERPL CALC.SUM OF ELEC: 289 MOSM/KG (ref 275–300)
PH UR STRIP: 5 [PH] (ref 5–6)
PLATELET # BLD: 489 10X3/UL (ref 130–400)
PMV BLD AUTO: 9.5 FL (ref 7.4–10.4)
POTASSIUM SERPL-SCNC: 3.9 MMOL/L (ref 3.5–5.1)
PROT SERPL-MCNC: 6 G/DL (ref 6.4–8.2)
PROT UR-MCNC: (no result) MG/DL
PROT UR-MCNC: 506.3 MG/DL (ref 0–11.9)
RBC # BLD AUTO: 3.11 10X6/UL (ref 4–5.4)
RBC #/AREA URNS HPF: (no result) /HPF (ref 0–5)
SODIUM SERPL-SCNC: 141 MMOL/L (ref 136–145)
SP GR UR STRIP: 1.02 (ref 1–1.02)
SQUAMOUS #/AREA URNS HPF: (no result) /HPF (ref 0–5)
UROBILINOGEN UR-MCNC: NORMAL MG/DL
WBC # BLD AUTO: 20.8 10X3/UL (ref 4.8–10.8)
YEAST #/AREA URNS HPF: (no result) /HPF

## 2019-10-18 NOTE — MORECARE
CASE MANAGEMENT DISCHARGE SUMMARY
 
 
PATIENT: TAVO GARCIA                   UNIT: [id number]
ACCOUNT#: [id number]                       ADM DATE: 10/16/19
AGE: 44     : [birth year]  SEX: F            ROOM/BED: D.2305    
AUTHOR: RADHA,DOC                             PHYSICIAN:                               
 
REFERRING PHYSICIAN: ROYCE ROMERO MD        
DATE OF SERVICE: 10/18/19
Discharge Plan
 
 
Patient Name: TAVO GARCIA
Facility: [facility]:[place]
Encounter #: [id number]
Medical Record #: [id number]
: 1975
Planned Disposition: 
Anticipated Discharge Date: 
 
Discharge Date: 
Expected LOS: 
Initial Reviewer: [id number]
Initial Review Date: 10/17/2019
Generated: 10/18/19   7:58 pm 
Comments
 
DCP- Discharge Planning
 
Updated by [email or web address]: Nataliya Kingsley on 10/18/19   5:57 pm CT
CM received notice that patient is needing transfer to another facility for 
infectious disease and CCRP. Dr. Ferrell had contacted transfer center.  
Nursing faxed over facesheet to transfer center. CM was notified that [facility] 
has accepted patient but they don't have bed availability.  CM has copied 
chart and got disk of images for when bed is available.  Nursing stated that 
patient will need air transport once bed availability. Will need to set-up 
survival flight for transfer. CM will continue to follow and assist as needed 
with discharge planning / needs.
DCP- Discharge Planning
 
Updated by [id number]: Nataliya Kingsley on 10/17/19   5:04 pm CT
Patient Name: TAVO GARCIA                                     
Admission Status: ER   
Accout number: [id number]                              
Admission Date: 10-   
: 1975                                                        
Admission Diagnosis:   
Attending: ROYCE ROMERO                                                
Current LOS:  1   
 
  
Anticipated DC Date:    
Planned Disposition:    
Primary Insurance: [employer] RIGO   
  
  
Discharge Planning Comments: CM met with patient's  to complete 
initial dc planning assessment.  CM educated  (Sri)  on the CM role 
and verbal consent given by patient to complete assessment.   Patient lives 
at  home with her  where she is independent with her care.  At 
discharge patient plans to return home and feels this is a safe discharge. 
Sri states they live in a camper @ [address] [address]  CM 
discussed availability of home health, rehab services, and medical equipment. 
Sri is uncertain of what her discharge needs may be since patient has 
surgery scheduled tomorrow and Monday.  Patient will most likely need HH and 
potentially rehab. Patient is still on vent sedated at this time. CM will 
continue to follow and will assist as needed with dc plans/needs.    
  
  
  
  
  
  
: Nataliya Kingsley
DCP- Discharge Planning
 
Updated by [email or web address]: Nataliya Kingsley on 10/16/19   5:50 pm CT
CM attempted to see patient for d/c planning assessment.  Patient is post-op 
currently on vent and no family available at this time. CM will continue to 
follow and assist as needed with discharge planning / needs
 DCPIA - Discharge Planning Initial Assessment
 
Updated by [email or web address]: Nataliya Kingsley on 10/17/19   5:46 pm
*  Is the patient Alert and Oriented?
Yes
*  How many steps to enter\exit or inside your home?
 
*  PCP
Deejay Romeo MD - [place]
*  Pharmacy
[facility] IN [place]
*  Preadmission Environment
Home with Family
*  ADLs
Independent
*  Equipment
None
*  List name and contact numbers for known caregivers / representatives who 
currently or will assist patient after discharge:
SRI GARCIA - [facility] - [phone]
*  Verbal permission to speak to the caregivers and representatives has been 
obtained from the patient.
 
N/A
*  Community resources currently utilized
None
*  Additional services required to return to the preadmission environment?
No
*  Can the patient safely return to the preadmission environment?
Yes
*  Has this patient been hospitalized within the prior 30 days at any 
hospital?
Yes
 
 
 
 
 
 
 
Last DP export: 10/17/19   5:08 
Patient Name: TAVO GARCIA
Encounter #: [id number]
Page [phone]
 
 
 
 
 
Electronically Signed by VY GARCIA on 10/18/19 at 1859
 
 
 
 
 
 
**All edits/amendments must be made on the electronic document**
 
DICTATION DATE: 10/18/19 1858     : JOLIE  10/18/19 1858     
RPT#: [id number]                                DC DATE:        
                                               STATUS: ADM IN  
[facility]
 [place] [address]
***END OF REPORT***

## 2019-10-21 NOTE — MORECARE
CASE MANAGEMENT DISCHARGE SUMMARY
 
 
PATIENT: TAVO GARCIA                   UNIT: [id number]
ACCOUNT#: [id number]                       ADM DATE: 10/16/19
AGE: 44     : [birth year]  SEX: F            ROOM/BED: D.2305    
AUTHOR: RADHA,DOC                             PHYSICIAN:                               
 
REFERRING PHYSICIAN: ROYCE ROMERO MD        
DATE OF SERVICE: 10/21/19
Discharge Plan
 
 
Patient Name: TAVO GARCIA
Facility: [facility]:[place]
Encounter #: [id number]
Medical Record #: [id number]
: 1975
Planned Disposition: 
Anticipated Discharge Date: 
 
Discharge Date: 10/18/2019
Expected LOS: 
Initial Reviewer: [email or web address]
Initial Review Date: 10/17/2019
Generated: 10/21/19   9:43 am 
Comments
 
DCP- Discharge Planning
 
Updated by [id number]: Nataliya Kingsley on 10/18/19   5:57 pm CT
CM received notice that patient is needing transfer to another facility for 
infectious disease and CCRP. Dr. Ferrell had contacted transfer center.  
Nursing faxed over facesheet to transfer center. CM was notified that [facility] 
has accepted patient but they don't have bed availability.  CM has copied 
chart and got disk of images for when bed is available.  Nursing stated that 
patient will need air transport once bed availability. Will need to set-up 
survival flight for transfer. CM will continue to follow and assist as needed 
with discharge planning / needs.
DCP- Discharge Planning
 
Updated by [id number]: Nataliya Kingsley on 10/17/19   5:04 pm CT
Patient Name: TAVO GARCIA                                     
Admission Status: ER   
Accout number: [id number]                              
Admission Date: 10-   
: 1975                                                        
Admission Diagnosis:   
Attending: ROYCE ROMERO                                                
Current LOS:  1   
 
  
Anticipated DC Date:    
Planned Disposition:    
Primary Insurance: [employer] RIG[employer]   
  
  
Discharge Planning Comments: CM met with patient's  to complete 
initial dc planning assessment.  CM educated  (Sri)  on the CM role 
and verbal consent given by patient to complete assessment.   Patient lives 
at  home with her  where she is independent with her care.  At 
discharge patient plans to return home and feels this is a safe discharge. 
Sri states they live in a camper @ [address] [place] [address]  CM 
discussed availability of home health, rehab services, and medical equipment. 
Sri is uncertain of what her discharge needs may be since patient has 
surgery scheduled tomorrow and Monday.  Patient will most likely need HH and 
potentially rehab. Patient is still on vent sedated at this time. CM will 
continue to follow and will assist as needed with dc plans/needs.    
  
  
  
  
  
  
: Nataliya Kingsley
DCP- Discharge Planning
 
Updated by [id number]: Nataliya Kinsgley on 10/16/19   5:50 pm CT
CM attempted to see patient for d/c planning assessment.  Patient is post-op 
currently on vent and no family available at this time. CM will continue to 
follow and assist as needed with discharge planning / needs
 DCPIA - Discharge Planning Initial Assessment
 
Updated by [email or web address]: Nataliya Kingsley on 10/17/19   5:46 pm
*  Is the patient Alert and Oriented?
Yes
*  How many steps to enter\exit or inside your home?
 
*  PCP
Deejay Romeo MD - [place]
*  Pharmacy
[facility] IN [place]
*  Preadmission Environment
Home with Family
*  ADLs
Independent
*  Equipment
None
*  List name and contact numbers for known caregivers / representatives who 
currently or will assist patient after discharge:
SRI GARCIA - [facility] - [phone]
*  Verbal permission to speak to the caregivers and representatives has been 
obtained from the patient.
 
N/A
*  Community resources currently utilized
None
*  Additional services required to return to the preadmission environment?
No
*  Can the patient safely return to the preadmission environment?
Yes
*  Has this patient been hospitalized within the prior 30 days at any 
hospital?
Yes
 
 
 
 
 
 
 
Last DP export: 10/18/19   5:59 
Patient Name: TAVO GARCIA
Encounter #: [id number]
Page [phone]
 
 
 
 
 
Electronically Signed [facility] on 10/21/19 at 0844
 
 
 
 
 
 
**All edits/amendments must be made on the electronic document**
 
DICTATION DATE: 10/21/19 0843     : JOLIE  10/21/19 0843     
RPT#: [id number]                                DC DATE:10/18/19
                                               STATUS: DIS IN  
[facility]
 [place] [address]
***END OF REPORT***

## 2019-11-04 NOTE — OP
PATIENT NAME:  TAVO GARCIA                         MEDICAL RECORD: [id number]
:[birth year]                                             LOCATION:[id number]
                                                         ADMISSION DATE:10/02/19
SURGEON:  BRAYAN COLON MD          
 
 
DATE OF OPERATION:  10/02/2019
 
This is a before dictated operative note by Dr. Ferrell with Dr. Colon is the
primary.
 
PREOPERATIVE DIAGNOSES:  Endometriosis and chronic pelvic pain.
 
POSTOPERATIVE DIAGNOSES:  Endometriosis, chronic pelvic pain, extensive
intra-abdominal adhesions.
 
PRIMARY SURGEON:  Brayan Colon MD
 
ASSISTANT SURGEON:  April Ferrell DO
 
ANESTHESIOLOGIST:  Michoacano Snyder CRNA
 
ANESTHETIC:  General.
 
PROCEDURES:  Diagnostic laparoscopy converted to an open total abdominal
hysterectomy, right salpingectomy, left partial salpingectomy, and cystoscopy.
 
FINDINGS:  External intraabdominal adhesions including adhesions of omentum to
anterior abdominal wall and fibrotic adhesions of bladder to anterior uterus due
to blood-tinged urine, cystoscopy was performed.  The dome of the bladder had
mild bruising, no lacerations.  Normal ureteral jets noted bilaterally.
 
SPECIMENS:  Uterus, right fallopian tube, partial left fallopian tube, and
cervix.
 
ESTIMATED BLOOD LOSS:  200 cc.
 
IV FLUIDS:  1700 cc.
 
URINE OUTPUT:  100 cc of blood-tinged urine.
 
COMPLICATIONS:  None.
 
CONDITION:  Stable.
 
The risks, benefits, alternatives, and indications of the procedure were
discussed with the patient.  She voiced understanding of the procedure and
signed the consent.
 
DESCRIPTION OF PROCEDURE:  She was taken to the OR where general anesthesia was
administered and found to be adequate.  She was placed in the dorsal lithotomy
position.  She was prepped and draped in the normal sterile fashion.  A speculum
was placed in the posterior aspect of the vagina and a single tooth tenaculum
was used to grasp the anterior lip of the cervix.  The uterus was sounded to 10
cm.  The cervix was further dilated to accommodate the VCare uterine
manipulator.  The uterine manipulator was placed and the tenaculum was removed
from the vagina.  The tenaculum was removed from the vagina.  Gloves were
changed and attention was turned to the abdomen.  The abdomen was elevated and a
 
 
 
OPERATIVE REPORT                               [id number]    TAVO GARCIA       
 
 
5-mm incision was created in the umbilicus after Marcaine placement.  A 5-mm
port was placed with direct laparoscopic guidance.  The pneumoperitoneum was
achieved to 15 mmHg.  Extensive intra-abdominal adhesions were noted, especially
adhesions of the omentum to the anterior abdominal wall; however, right lower
quadrant port was able to be placed under direct laparoscopic visualization.  At
that time, due to extensive intraabdominal adhesions, decision was made to
perform an open total hysterectomy.  The pneumoperitoneum was released from the
abdomen.  The VCare manipulator was removed from the cervix and the uterus, and
the ports were removed from the abdomen.  A Pfannenstiel skin incision was made
with the scalpel and carried down to the underlying layer of the fascia with the
Bovie.  The fascia was incised in the midline and extended laterally.  The
inferior aspect of the fascial incision was grasped with Kocher clamps and the
rectus muscle was dissected off sharply.  Attention was then turned to the
superior aspect of the fascial incision.  The rectus muscle was dissected off in
similar fashion.  The rectus muscle was  in the midline down to the
level of the peritoneum.  The peritoneum was grasped with 2 Allis.  Allis is
noted to be free of adherent bowel and entered with Metzenbaum scissors.  The
peritoneum was further  with a combination of gentle traction and blunt
and sharp dissection.  The patient was placed in Trendelenburg position and the
bowel was packed away using moist laparotomy sponges.  The uterus was grasped
along the cornua of the uterus.  Upward traction was applied to facilitate
exposure and dissection.  The round ligament on the left side was identified,
grasped with a Dagmar suture, ligated and cut with Bovie.  The anterior leaf of
the broad ligament was dissected to the midline and the vesicouterine
peritoneum.  The bladder was dissected off the lower uterine segment and cervix
with Metzenbaum scissors.  A window was created in the avascular area of the
posterior leaf.  The ovarian ligament and partial fallopian tube was clamped,
cut, and doubly ligated, unable to perform complete salpingectomy due to
adhesive disease.  The uterine vessels were skeletonized, doubly clamped and
cut.  The pedicles were suture ligated and good hemostasis was noted.  The
entire procedure was repeated on the right side.  At that time, the cardinal
ligament and uterosacral ligaments were sequentially clamped, cut and suture
ligated.  Curved Z clamps were placed at the angles of the vagina.  The cervix
was removed from the vaginal cuff with scissors.  The vaginal edge of the angle
was closed with figure-of-eight 0 Vicryl sutures and tied to the uterosacral
ligament to provide support to the vaginal cuff.  The vaginal cuff was then
closed with figure-of-eight 0 Vicryl sutures and good hemostasis was noted.  The
pelvis was irrigated with warm sterile saline.  Due to extensive intra-abdominal
adhesions and adhesions of the bladder to the anterior part of the uterus,
decision was made for cystoscopy and methylene blue was given.  The patient was
taken out of Trendelenburg position.  All of the instruments were removed from
the abdomen after hemostasis was noted to be adequate.  Lorin was placed at the
vaginal cuff.  The rectus muscle was closed with 2-0 chromic suture.  The
fascial incision was closed with 2-0 PDS loop suture with good hemostasis.  The
subcutaneous fat was closed with 2-0 plain suture with good hemostasis.  The
skin was closed with staples.  The umbilical and right lower quadrant port sites
were closed with 3-0 Monocryl with good hemostasis and Dermabond covering.  At
that time, attention was then turned to the cystoscopy.  The Vigil was removed
and cystoscope was inserted into the urethra and bladder.  Good urethral jets
were noted bilaterally.  Small amount of bruising noted at the dome of the
fundus; however, no lacerations were noted.  The cystoscope was removed and the
Vigil was replaced.  The patient tolerated the procedure well.  She was awakened
and taken to the recovery room in stable condition.
 
TRANSINT:[id number] Voice Confirmation ID: [id number] DOCUMENT ID: [id number]
 
 
 
OPERATIVE REPORT                               [id number]    TAVO GARCIA JOSEPH E MD          
 
 
 
Electronically Signed by BRAYAN COLON on 19 at 0925
 
 
 
 
 
 
 
 
 
 
 
 
 
 
 
 
 
 
 
 
 
 
 
 
 
 
 
 
 
 
 
 
 
 
 
 
 
 
 
 
 
CC:                                                             [id number]
DICTATION DATE: 19 08     :     19 1145      DIS IN  
                                                                      10/04/19
[facility]                                          
[address] [place] [address]

## 2019-11-08 NOTE — OP
PATIENT NAME:  TAVO GARCIA                         MEDICAL RECORD: [id number]
:[birth year]                                             LOCATION:[facility]    [id number]
                                                         ADMISSION DATE:10/16/19
SURGEON:  TONI SCOTT DO            
 
 
DATE OF OPERATION:  10/16/2019
 
PREOPERATIVE DIAGNOSIS:  Suspected necrotizing fasciitis, pelvic abscess.
 
POSTOPERATIVE DIAGNOSES:  Suspected necrotizing fasciitis, pelvic abscess plus
intra-abdominal adhesions.
 
PRIMARY SURGEON:  Toni Scott DO
 
INTRAOPERATIVE CONSULTANTS:  Dr. Milan and Dr. Prather
 
ASSISTANT SURGEON:  Dr. Rojas
 
ANESTHESIA:  Jennifer Jolley CRNA.
 
PROCEDURES:  Wound exploration, pelvic washout, wound debridement, repair of
cystotomy, cystoscopy, and placement of MARCIANO drain.
 
FINDINGS:  Intact vaginal cuff.  Extensive amount of dead tissue noted on skin
and in subcutaneous fat.  Dusky fascia.  Purulent fluid in the abdomen. 
Extensive intra-abdominal adhesions and adhesions of bladder to posterior rectus
muscle.
 
SPECIMENS:  Portions of skin, subcutaneous fat, and fascia.
 
ESTIMATED BLOOD LOSS:  300 cc.
 
IV FLUIDS:  2400 cc.
 
URINE OUTPUT:  400 cc prior to cystotomy.
 
COMPLICATIONS:  Upon entering the abdomen, cystotomy noted.  Urology called and
bladder dissected and repaired.  Cystoscopy was performed to ensure no leaks.
 
DESCRIPTION OF PROCEDURE:  The risks, benefits, alternatives, and indications of
the procedure were discussed with the patient and she voiced understanding of
the procedure and signed the consent.  She was taken to the OR, where general
anesthesia was administered and found to be adequate.  She was placed in the
dorsal lithotomy position.  She was prepped and draped in a normal sterile
fashion.  Exam under anesthesia revealed purulent discharge from the vaginal
cuff; however, vaginal cuff appeared intact.  A Vigil was placed.  Attention was
then turned to the abdomen and gloves were changed.  The previous Pfannenstiel
incision was opened with the scalpel.  At that time, copious amounts of purulent
and serous fluid came out of the incision.  Portions of the skin were noted to
be dusky and dead as well as portions of the subcutaneous fat.  These portions
were excised using the scalpel and sent to pathology.  Wound cultures were
taken.  Extensive debridement of the subcutaneous fat was performed until
healthy tissue with blood supply was noted.  The previous fascial incision was
opened with scissors and dusky and gray fascia was noted.  These portions of the
fascia were removed with Martinez scissors.  The rectus muscle was  down to
the level of the peritoneum.  Purulent discharge from the intra-abdominal cavity
was noted.  The rectus muscle and fascia was opened with blunt dissection.  At
 
 
 
OPERATIVE REPORT                               [id number]    RADHATAVO ROSSANA       
 
 
that time, the bladder was noted to be adhesed to the posterior portion of the
rectus muscle and a bladder laceration was noted.  Urology was consulted and
repaired the bladder in multiple layer closure and a cystoscopy was performed to
ensure no bladder leaks.  That procedure was dictated separately.  The pelvis
was noted to have multiple intra-abdominal adhesions, adhesions of the bowel to
the bladder as well as the rectus were noted and were lysed.  Copious irrigation
of the pelvis was performed.  The pelvis was then washed out with antibiotic
fluid and suction irrigated.  The rectus muscle was closed with 0 Vicryl suture.
 At that time, general surgery was consulted for recommendations regarding
closure.  Due to extensive debridement and infection, recommendations at that
time were to not close the fascia and instead to pack the abdomen with laps
soaked in Dakin solution.  Prior to closing the rectus muscle, a MARCIANO drain was
placed in the pelvis.  The rectus muscle was then closed with 0 Vicryl suture
and general surgery was consulted regarding recommendations for closure. 
Recommendations were to not close the fascia or the subcutaneous fat due to
infection and to instead pack the abdomen with lap pad soaked in Dakin solution.
 The 14 lap pads soaked in Dakin solution were packed into the abdomen above the
rectus muscle and Ioban was placed.  The patient will be taken back to the OR in
48 hours to assess infection for possible repeat debridement for wound VAC
placement.  The patient tolerated the procedure well.  She will remain intubated
and sedated until her next procedure.  All needle, lap, sponge, and instrument
counts were correct times 2.  She was taken to the recovery room in stable
condition.
 
TRANSINT:[id number] Voice Confirmation ID: [id number] DOCUMENT ID: [id number]
                                           
                                           TONI SCOTT DO            
 
 
 
Electronically Signed by TONI VIEYRA on 19 at 1226
 
 
 
 
 
 
 
 
 
 
 
 
 
 
 
 
CC:                                                             [id number]
DICTATION DATE: 10/17/19 1702     :     10/17/19 2342      DIS IN  
                                                                      10/18/19
[facility]                                          
[address] [place] [address]